# Patient Record
Sex: FEMALE | Race: WHITE | HISPANIC OR LATINO
[De-identification: names, ages, dates, MRNs, and addresses within clinical notes are randomized per-mention and may not be internally consistent; named-entity substitution may affect disease eponyms.]

---

## 2018-04-30 ENCOUNTER — APPOINTMENT (OUTPATIENT)
Dept: CARDIOLOGY | Facility: CLINIC | Age: 43
End: 2018-04-30
Payer: COMMERCIAL

## 2018-04-30 ENCOUNTER — NON-APPOINTMENT (OUTPATIENT)
Age: 43
End: 2018-04-30

## 2018-04-30 VITALS
BODY MASS INDEX: 38.62 KG/M2 | DIASTOLIC BLOOD PRESSURE: 83 MMHG | WEIGHT: 218 LBS | SYSTOLIC BLOOD PRESSURE: 151 MMHG | OXYGEN SATURATION: 98 % | HEART RATE: 83 BPM | RESPIRATION RATE: 17 BRPM | HEIGHT: 63 IN

## 2018-04-30 DIAGNOSIS — Z78.9 OTHER SPECIFIED HEALTH STATUS: ICD-10-CM

## 2018-04-30 DIAGNOSIS — R06.09 OTHER FORMS OF DYSPNEA: ICD-10-CM

## 2018-04-30 DIAGNOSIS — R07.89 OTHER CHEST PAIN: ICD-10-CM

## 2018-04-30 DIAGNOSIS — Z86.39 PERSONAL HISTORY OF OTHER ENDOCRINE, NUTRITIONAL AND METABOLIC DISEASE: ICD-10-CM

## 2018-04-30 DIAGNOSIS — Z82.3 FAMILY HISTORY OF STROKE: ICD-10-CM

## 2018-04-30 DIAGNOSIS — R00.2 PALPITATIONS: ICD-10-CM

## 2018-04-30 PROCEDURE — 93306 TTE W/DOPPLER COMPLETE: CPT

## 2018-04-30 PROCEDURE — 99244 OFF/OP CNSLTJ NEW/EST MOD 40: CPT

## 2018-04-30 PROCEDURE — 93000 ELECTROCARDIOGRAM COMPLETE: CPT

## 2018-04-30 RX ORDER — METOPROLOL SUCCINATE 25 MG/1
25 TABLET, EXTENDED RELEASE ORAL
Refills: 0 | Status: ACTIVE | COMMUNITY

## 2018-05-14 ENCOUNTER — APPOINTMENT (OUTPATIENT)
Dept: CARDIOLOGY | Facility: CLINIC | Age: 43
End: 2018-05-14
Payer: COMMERCIAL

## 2018-05-14 DIAGNOSIS — R94.39 ABNORMAL RESULT OF OTHER CARDIOVASCULAR FUNCTION STUDY: ICD-10-CM

## 2018-05-14 PROCEDURE — 93015 CV STRESS TEST SUPVJ I&R: CPT

## 2018-05-31 ENCOUNTER — APPOINTMENT (OUTPATIENT)
Dept: CARDIOLOGY | Facility: CLINIC | Age: 43
End: 2018-05-31
Payer: COMMERCIAL

## 2018-05-31 PROCEDURE — A9500: CPT

## 2018-05-31 PROCEDURE — 78452 HT MUSCLE IMAGE SPECT MULT: CPT

## 2018-05-31 PROCEDURE — 93015 CV STRESS TEST SUPVJ I&R: CPT

## 2018-12-22 ENCOUNTER — EMERGENCY (EMERGENCY)
Facility: HOSPITAL | Age: 43
LOS: 1 days | Discharge: HOME | End: 2018-12-22
Attending: EMERGENCY MEDICINE | Admitting: EMERGENCY MEDICINE
Payer: COMMERCIAL

## 2018-12-22 VITALS
WEIGHT: 229.94 LBS | SYSTOLIC BLOOD PRESSURE: 189 MMHG | HEART RATE: 74 BPM | HEIGHT: 62 IN | DIASTOLIC BLOOD PRESSURE: 85 MMHG | OXYGEN SATURATION: 98 % | RESPIRATION RATE: 16 BRPM | TEMPERATURE: 98 F

## 2018-12-22 VITALS
HEART RATE: 77 BPM | OXYGEN SATURATION: 99 % | RESPIRATION RATE: 18 BRPM | DIASTOLIC BLOOD PRESSURE: 79 MMHG | SYSTOLIC BLOOD PRESSURE: 154 MMHG

## 2018-12-22 DIAGNOSIS — S89.90XA UNSPECIFIED INJURY OF UNSPECIFIED LOWER LEG, INITIAL ENCOUNTER: ICD-10-CM

## 2018-12-22 PROCEDURE — 99283 EMERGENCY DEPT VISIT LOW MDM: CPT

## 2018-12-22 PROCEDURE — 73564 X-RAY EXAM KNEE 4 OR MORE: CPT | Mod: 26,LT

## 2018-12-22 PROCEDURE — 73564 X-RAY EXAM KNEE 4 OR MORE: CPT

## 2018-12-22 RX ORDER — IBUPROFEN 200 MG
600 TABLET ORAL ONCE
Qty: 0 | Refills: 0 | Status: COMPLETED | OUTPATIENT
Start: 2018-12-22 | End: 2018-12-22

## 2018-12-22 RX ADMIN — Medication 600 MILLIGRAM(S): at 15:51

## 2018-12-22 RX ADMIN — Medication 600 MILLIGRAM(S): at 16:46

## 2018-12-22 NOTE — ED PROVIDER NOTE - PROGRESS NOTE DETAILS
PA Tiberio- xray with mild joint effusion, no acute fractures or dislocations. Able to bear weight. No acute trauma or injury with no ligament instability- bulky Cordero applied with cane- Ortho referral given. Discussed weight loss in great detail.

## 2018-12-22 NOTE — ED ADULT NURSE NOTE - OBJECTIVE STATEMENT
Patient report having left knee pain that started 1 week ago and gradually getting worst. Patient denies any injury or long traveling.

## 2018-12-22 NOTE — ED PROVIDER NOTE - CARE PROVIDER_API CALL
Meng Fonseca), Orthopaedic Surgery; Sports Medicine  [address] [place] [address]  Phone: [phone]  Fax: [phone]

## 2018-12-22 NOTE — ED PROVIDER NOTE - ATTENDING CONTRIBUTION TO CARE
Dr. Mcknight: I performed a face to face bedside interview with patient regarding history of present illness, review of symptoms and past medical history. I completed an independent physical exam.  I have discussed patient's plan of care with PA.   I agree with note as stated above, having amended the EMR as needed to reflect my findings.   This includes HISTORY OF PRESENT ILLNESS, HIV, PAST MEDICAL/SURGICAL/FAMILY/SOCIAL HISTORY, ALLERGIES AND HOME MEDICATIONS, REVIEW OF SYSTEMS, PHYSICAL EXAM, and any PROGRESS NOTES during the time I functioned as the attending physician for this patient.    43F h/o DM, HTN, obesity, p/w atraumatic left knee pain x 1 week, able to ambulate but with pain. No fevers or chills.    Exam sig for point ttp over left inferior mid knee, no laxity, no rashes    Plan - motrin, xray

## 2018-12-22 NOTE — ED PROVIDER NOTE - OBJECTIVE STATEMENT
44 y/o obese F with h/o DM, HTN presenting with atraumatic left knee pain x 1 week. Denies injury, trauma, weakness, numbness, tingling, redness, warmth, fever, chills. States this morning the pain was so severe she had trouble walking.

## 2018-12-22 NOTE — ED PROCEDURE NOTE - ATTENDING CONTRIBUTION TO CARE
Dr. Mcknight: I performed a face to face bedside interview with patient regarding history of present illness, review of symptoms and past medical history. I completed an independent physical exam.  I have discussed patient's plan of care with PA.   I agree with note as stated above, having amended the EMR as needed to reflect my findings.   This includes HISTORY OF PRESENT ILLNESS, HIV, PAST MEDICAL/SURGICAL/FAMILY/SOCIAL HISTORY, ALLERGIES AND HOME MEDICATIONS, REVIEW OF SYSTEMS, PHYSICAL EXAM, and any PROGRESS NOTES during the time I functioned as the attending physician for this patient.

## 2018-12-22 NOTE — ED PROVIDER NOTE - MEDICAL DECISION MAKING DETAILS
Atraumatic left knee Injury- xray, Motrin, Knee immobilizer, Cane, ortho follow up for MRI vs. physical therapy

## 2018-12-22 NOTE — ED PROVIDER NOTE - NSFOLLOWUPCLINICS_GEN_ALL_ED_FT
[facility]  Orthopedic Surgery  [address] [address]  Phone: [phone]  Fax:   Follow Up Time:

## 2018-12-22 NOTE — ED PROVIDER NOTE - NSFOLLOWUPINSTRUCTIONS_ED_ALL_ED_FT
Follow up with PMD within 48-72 hrs. Rest and elevate your knee.  Apply ice 20 minutes on 2-3 times/day as needed for pain or swelling. Take Motrin 400mg every 6-8 hrs with food as needed for pain.  Keep ace wrap on during the day for comfort and support. Use cane provided for you as needed to assist with ambulation. Follow up with the Orthopedist doctor within the week for further evaluation- referrals listed above.

## 2019-03-15 PROBLEM — I10 ESSENTIAL (PRIMARY) HYPERTENSION: Chronic | Status: ACTIVE | Noted: 2018-12-22

## 2019-03-15 PROBLEM — E78.00 PURE HYPERCHOLESTEROLEMIA, UNSPECIFIED: Chronic | Status: ACTIVE | Noted: 2018-12-22

## 2019-04-05 ENCOUNTER — OUTPATIENT (OUTPATIENT)
Dept: OUTPATIENT SERVICES | Facility: HOSPITAL | Age: 44
LOS: 1 days | End: 2019-04-05
Payer: COMMERCIAL

## 2019-04-05 ENCOUNTER — APPOINTMENT (OUTPATIENT)
Dept: MAMMOGRAPHY | Facility: HOSPITAL | Age: 44
End: 2019-04-05

## 2019-04-05 DIAGNOSIS — Z00.8 ENCOUNTER FOR OTHER GENERAL EXAMINATION: ICD-10-CM

## 2019-04-05 PROCEDURE — 77067 SCR MAMMO BI INCL CAD: CPT | Mod: 26

## 2019-04-05 PROCEDURE — 77063 BREAST TOMOSYNTHESIS BI: CPT | Mod: 26

## 2019-04-05 PROCEDURE — 77067 SCR MAMMO BI INCL CAD: CPT

## 2019-04-05 PROCEDURE — 77063 BREAST TOMOSYNTHESIS BI: CPT

## 2019-04-26 ENCOUNTER — APPOINTMENT (OUTPATIENT)
Dept: ULTRASOUND IMAGING | Facility: HOSPITAL | Age: 44
End: 2019-04-26
Payer: COMMERCIAL

## 2019-04-26 ENCOUNTER — OUTPATIENT (OUTPATIENT)
Dept: OUTPATIENT SERVICES | Facility: HOSPITAL | Age: 44
LOS: 1 days | End: 2019-04-26
Payer: COMMERCIAL

## 2019-04-26 DIAGNOSIS — Z00.8 ENCOUNTER FOR OTHER GENERAL EXAMINATION: ICD-10-CM

## 2019-04-26 PROCEDURE — 76830 TRANSVAGINAL US NON-OB: CPT

## 2019-04-26 PROCEDURE — 76830 TRANSVAGINAL US NON-OB: CPT | Mod: 26

## 2020-09-04 NOTE — ED ADULT NURSE NOTE - PAIN RATING/NUMBER SCALE (0-10): ACTIVITY
Plan: May want LN2 face in the future.
Detail Level: Zone
Render In Strict Bullet Format?: No
Initiate Treatment: Momentasone cream\\nAllegra QAM \\nZytrec QHS prn itch
Otc Regimen: La roche intensive repair moisturizing cream\\nCerave facial cleanser \\nNeutrogena rapid wrinkle repair only when healed. Would like to try something for rejuvenation. Lancome product seemed to irritate skin.
10

## 2020-09-08 ENCOUNTER — EMERGENCY (EMERGENCY)
Facility: HOSPITAL | Age: 45
LOS: 1 days | Discharge: HOME | End: 2020-09-08
Attending: EMERGENCY MEDICINE | Admitting: EMERGENCY MEDICINE
Payer: COMMERCIAL

## 2020-09-08 VITALS
RESPIRATION RATE: 17 BRPM | HEART RATE: 85 BPM | TEMPERATURE: 98 F | WEIGHT: 225.09 LBS | OXYGEN SATURATION: 96 % | SYSTOLIC BLOOD PRESSURE: 159 MMHG | HEIGHT: 62 IN | DIASTOLIC BLOOD PRESSURE: 89 MMHG

## 2020-09-08 VITALS
RESPIRATION RATE: 14 BRPM | SYSTOLIC BLOOD PRESSURE: 152 MMHG | DIASTOLIC BLOOD PRESSURE: 81 MMHG | OXYGEN SATURATION: 98 %

## 2020-09-08 DIAGNOSIS — Z98.891 HISTORY OF UTERINE SCAR FROM PREVIOUS SURGERY: Chronic | ICD-10-CM

## 2020-09-08 DIAGNOSIS — R10.9 UNSPECIFIED ABDOMINAL PAIN: ICD-10-CM

## 2020-09-08 LAB
ALBUMIN SERPL ELPH-MCNC: 3.7 G/DL — SIGNIFICANT CHANGE UP (ref 3.3–5)
ALP SERPL-CCNC: 107 U/L — SIGNIFICANT CHANGE UP (ref 40–120)
ALT FLD-CCNC: 145 U/L — HIGH (ref 10–45)
ANION GAP SERPL CALC-SCNC: 11 MMOL/L — SIGNIFICANT CHANGE UP (ref 5–17)
APPEARANCE UR: CLEAR — SIGNIFICANT CHANGE UP
AST SERPL-CCNC: 101 U/L — HIGH (ref 10–40)
BACTERIA # UR AUTO: ABNORMAL /HPF
BASOPHILS # BLD AUTO: 0.02 K/UL — SIGNIFICANT CHANGE UP (ref 0–0.2)
BASOPHILS NFR BLD AUTO: 0.3 % — SIGNIFICANT CHANGE UP (ref 0–2)
BILIRUB SERPL-MCNC: 0.2 MG/DL — SIGNIFICANT CHANGE UP (ref 0.2–1.2)
BILIRUB UR-MCNC: NEGATIVE — SIGNIFICANT CHANGE UP
BUN SERPL-MCNC: 13 MG/DL — SIGNIFICANT CHANGE UP (ref 7–23)
CALCIUM SERPL-MCNC: 9.1 MG/DL — SIGNIFICANT CHANGE UP (ref 8.4–10.5)
CHLORIDE SERPL-SCNC: 104 MMOL/L — SIGNIFICANT CHANGE UP (ref 96–108)
CO2 SERPL-SCNC: 25 MMOL/L — SIGNIFICANT CHANGE UP (ref 22–31)
COLOR SPEC: YELLOW — SIGNIFICANT CHANGE UP
CREAT SERPL-MCNC: 0.81 MG/DL — SIGNIFICANT CHANGE UP (ref 0.5–1.3)
DIFF PNL FLD: ABNORMAL
EOSINOPHIL # BLD AUTO: 0.18 K/UL — SIGNIFICANT CHANGE UP (ref 0–0.5)
EOSINOPHIL NFR BLD AUTO: 2.6 % — SIGNIFICANT CHANGE UP (ref 0–6)
EPI CELLS # UR: SIGNIFICANT CHANGE UP
GLUCOSE SERPL-MCNC: 185 MG/DL — HIGH (ref 70–99)
GLUCOSE UR QL: NEGATIVE — SIGNIFICANT CHANGE UP
HCT VFR BLD CALC: 36.8 % — SIGNIFICANT CHANGE UP (ref 34.5–45)
HGB BLD-MCNC: 12.2 G/DL — SIGNIFICANT CHANGE UP (ref 11.5–15.5)
IMM GRANULOCYTES NFR BLD AUTO: 0.6 % — SIGNIFICANT CHANGE UP (ref 0–1.5)
KETONES UR-MCNC: NEGATIVE — SIGNIFICANT CHANGE UP
LEUKOCYTE ESTERASE UR-ACNC: NEGATIVE — SIGNIFICANT CHANGE UP
LIDOCAIN IGE QN: 230 U/L — SIGNIFICANT CHANGE UP (ref 73–393)
LYMPHOCYTES # BLD AUTO: 2.4 K/UL — SIGNIFICANT CHANGE UP (ref 1–3.3)
LYMPHOCYTES # BLD AUTO: 34.7 % — SIGNIFICANT CHANGE UP (ref 13–44)
MCHC RBC-ENTMCNC: 25.7 PG — LOW (ref 27–34)
MCHC RBC-ENTMCNC: 33.2 GM/DL — SIGNIFICANT CHANGE UP (ref 32–36)
MCV RBC AUTO: 77.5 FL — LOW (ref 80–100)
MONOCYTES # BLD AUTO: 0.4 K/UL — SIGNIFICANT CHANGE UP (ref 0–0.9)
MONOCYTES NFR BLD AUTO: 5.8 % — SIGNIFICANT CHANGE UP (ref 2–14)
NEUTROPHILS # BLD AUTO: 3.87 K/UL — SIGNIFICANT CHANGE UP (ref 1.8–7.4)
NEUTROPHILS NFR BLD AUTO: 56 % — SIGNIFICANT CHANGE UP (ref 43–77)
NITRITE UR-MCNC: NEGATIVE — SIGNIFICANT CHANGE UP
NRBC # BLD: 0 /100 WBCS — SIGNIFICANT CHANGE UP (ref 0–0)
PH UR: 5 — SIGNIFICANT CHANGE UP (ref 5–8)
PLATELET # BLD AUTO: 225 K/UL — SIGNIFICANT CHANGE UP (ref 150–400)
POTASSIUM SERPL-MCNC: 3.5 MMOL/L — SIGNIFICANT CHANGE UP (ref 3.5–5.3)
POTASSIUM SERPL-SCNC: 3.5 MMOL/L — SIGNIFICANT CHANGE UP (ref 3.5–5.3)
PROT SERPL-MCNC: 7.7 G/DL — SIGNIFICANT CHANGE UP (ref 6–8.3)
PROT UR-MCNC: 15
RBC # BLD: 4.75 M/UL — SIGNIFICANT CHANGE UP (ref 3.8–5.2)
RBC # FLD: 13.7 % — SIGNIFICANT CHANGE UP (ref 10.3–14.5)
RBC CASTS # UR COMP ASSIST: ABNORMAL /HPF (ref 0–4)
SODIUM SERPL-SCNC: 140 MMOL/L — SIGNIFICANT CHANGE UP (ref 135–145)
SP GR SPEC: 1.01 — SIGNIFICANT CHANGE UP (ref 1.01–1.02)
UROBILINOGEN FLD QL: NEGATIVE — SIGNIFICANT CHANGE UP
WBC # BLD: 6.91 K/UL — SIGNIFICANT CHANGE UP (ref 3.8–10.5)
WBC # FLD AUTO: 6.91 K/UL — SIGNIFICANT CHANGE UP (ref 3.8–10.5)
WBC UR QL: SIGNIFICANT CHANGE UP /HPF (ref 0–5)

## 2020-09-08 PROCEDURE — 36415 COLL VENOUS BLD VENIPUNCTURE: CPT

## 2020-09-08 PROCEDURE — 80053 COMPREHEN METABOLIC PANEL: CPT

## 2020-09-08 PROCEDURE — 99285 EMERGENCY DEPT VISIT HI MDM: CPT

## 2020-09-08 PROCEDURE — 81025 URINE PREGNANCY TEST: CPT

## 2020-09-08 PROCEDURE — 96374 THER/PROPH/DIAG INJ IV PUSH: CPT | Mod: XU

## 2020-09-08 PROCEDURE — 87086 URINE CULTURE/COLONY COUNT: CPT

## 2020-09-08 PROCEDURE — 81001 URINALYSIS AUTO W/SCOPE: CPT

## 2020-09-08 PROCEDURE — 96361 HYDRATE IV INFUSION ADD-ON: CPT

## 2020-09-08 PROCEDURE — 96375 TX/PRO/DX INJ NEW DRUG ADDON: CPT

## 2020-09-08 PROCEDURE — 85025 COMPLETE CBC W/AUTO DIFF WBC: CPT

## 2020-09-08 PROCEDURE — 74177 CT ABD & PELVIS W/CONTRAST: CPT | Mod: 26

## 2020-09-08 PROCEDURE — 74177 CT ABD & PELVIS W/CONTRAST: CPT

## 2020-09-08 PROCEDURE — 83690 ASSAY OF LIPASE: CPT

## 2020-09-08 PROCEDURE — 99284 EMERGENCY DEPT VISIT MOD MDM: CPT | Mod: 25

## 2020-09-08 RX ORDER — SODIUM CHLORIDE 9 MG/ML
1000 INJECTION INTRAMUSCULAR; INTRAVENOUS; SUBCUTANEOUS ONCE
Refills: 0 | Status: COMPLETED | OUTPATIENT
Start: 2020-09-08 | End: 2020-09-08

## 2020-09-08 RX ORDER — ONDANSETRON 8 MG/1
4 TABLET, FILM COATED ORAL ONCE
Refills: 0 | Status: COMPLETED | OUTPATIENT
Start: 2020-09-08 | End: 2020-09-08

## 2020-09-08 RX ORDER — MORPHINE SULFATE 50 MG/1
4 CAPSULE, EXTENDED RELEASE ORAL ONCE
Refills: 0 | Status: DISCONTINUED | OUTPATIENT
Start: 2020-09-08 | End: 2020-09-08

## 2020-09-08 RX ADMIN — ONDANSETRON 4 MILLIGRAM(S): 8 TABLET, FILM COATED ORAL at 15:05

## 2020-09-08 RX ADMIN — MORPHINE SULFATE 4 MILLIGRAM(S): 50 CAPSULE, EXTENDED RELEASE ORAL at 15:05

## 2020-09-08 RX ADMIN — SODIUM CHLORIDE 1000 MILLILITER(S): 9 INJECTION INTRAMUSCULAR; INTRAVENOUS; SUBCUTANEOUS at 16:05

## 2020-09-08 RX ADMIN — SODIUM CHLORIDE 1000 MILLILITER(S): 9 INJECTION INTRAMUSCULAR; INTRAVENOUS; SUBCUTANEOUS at 15:05

## 2020-09-08 RX ADMIN — MORPHINE SULFATE 4 MILLIGRAM(S): 50 CAPSULE, EXTENDED RELEASE ORAL at 15:20

## 2020-09-08 NOTE — ED PROVIDER NOTE - NSFOLLOWUPINSTRUCTIONS_ED_ALL_ED_FT
Follow up with your pmd within 48 hours- show copies of ER results.   Take tylenol 650 mg every 6 hours as needed for pain   Return to the ED if any worsening or persistent symptoms.     Dolor abdominal kd    LO QUE NECESITA SABER:    No se puede encontrar la causa del dolor abdominal. Si se encuentra rajni causa, el tratamiento dependerá de cuál es fuad causa.    INSTRUCCIONES SOBRE EL GINO HOSPITALARIA:    Regrese a la nile de emergencias si:    Usted no puede dejar de vomitar o vomita natalya.      Usted tiene natalya en las evacuaciones intestinales o estas tienen un aspecto alquitranado.      Usted tiene sangrado por hope recto.      El tamaño del abdomen es más marin de lo normal y se siente tarik y más doloroso.      Tiene dolor abdominal intenso.      Usted demarcus de tener flatulencias y evacuaciones intestinales.      Usted se siente mareado, débil o tiene sensación de desmayo.    Comuníquese con hope médico si:    Tiene fiebre.      Tiene nuevos signos y síntomas.      Kourtney síntomas no mejoran con el tratamiento.      Usted tiene preguntas o inquietudes acerca de hope condición o cuidado.    Los medicamentosestos pueden administrarse para disminuir el dolor, tratar rajni infección y manejar kourtney síntomas. B and E kourtney medicamentos peace se le haya indicado. Llame a hope médico si usted piensa que el medicamento no está ayudando o si tiene efectos secundarios. Infórmele si es alérgico a cualquier medicamento. Mantenga rajni lista actualizada de los medicamentos, las vitaminas y los productos herbales que young. Incluya los siguientes datos de los medicamentos: cantidad, frecuencia y motivo de administración. Traiga con usted la lista o los envases de las píldoras a kourtney citas de seguimiento. Lleve la lista de los medicamentos con usted en deana de rajni emergencia.    El manejo de kourtney síntomas:    La aplicación de calorsobre el abdomen de 20 a 30 minutos cada 2 horas por los días que le indiquen. El calor ayuda a disminuir el dolor y los espasmos musculares.      Controle hope estrés.El estrés puede causar dolor abdominal. Hope médico puede recomendarle técnicas de relajación y ejercicios de respiración profunda para ayudar a disminuir el estrés. Hope médico puede recomendarle que hable con alguien sobre hope estrés o ansiedad, peace un consejero o un amigo de confianza. Duerma lo suficiente y realice ejercicio regularmente.      Limite o no consuma bebidas alcohólicas.El alcohol puede empeorar el dolor abdominal. Pregunte a hope médico si usted puede josé miguel alcohol. Pregunte cuanto es la cantidad armando para usted josé miguel.      No fume.La nicotina y otros químicos en los cigarrillos pueden dañarle el esófago y el estómago. Pida información a hope médico si usted actualmente fuma y necesita ayuda para dejar de fumar. Los cigarrillos electrónicos o el tabaco sin humo igualmente contienen nicotina. Consulte con hope médico antes de utilizar estos productos.    Realice cambios en los alimentos que consume según se le indique:No coma alimentos que causan dolor abdominal u otros síntomas. Ingiera comidas pequeñas, más a menudo.     Coma más alimentos ricos en fibra si tiene estreñimiento. Los alimentos altos en fibra incluyen frutas, verduras, alimentos de grano integral y legumbres.      No coma alimentos que causan gas si tiene distensión. Por ejemplo, brócoli, col y coliflor. No henna gaseosas o bebidas carbonadas, ya que también pueden provocarle gases.      No consuma alimentos o bebidas que contienen sorbitol o fructosa si tiene diarrea y distensión. Algunos ejemplos son jugos de frutas, dulces, mermeladas y gomas de mascar sin azúcar.      No coma alimentos altos en grasas, peace comidas fritas, hamburguesas con queso, salchichas y postres.      Limite o no tome cafeína. La cafeína puede empeorar los síntomas, peace la acidez o las náuseas.      Henna suficientes líquidos para evitar la deshidratación causada por la diarrea o los vómitos. Pregunte a hope médico sobre la cantidad de líquido que necesita josé miguel todos los sony y cuáles le recomienda.    Acuda a kourtney consultas de control con hope médico según le indicaron.Anote kourtney preguntas para que se acuerde de hacerlas gregorio kourtney visitas.       © Copyright [employer] 2020       back to top                      © Copyright [employer] 2020

## 2020-09-08 NOTE — ED PROVIDER NOTE - PATIENT PORTAL LINK FT
You can access the FollowMyHealth Patient Portal offered by [facility] by registering at the following website: [email or web address]. By joining [facility]’s FollowMyHealth portal, you will also be able to view your health information using other applications (apps) compatible with our system.

## 2020-09-08 NOTE — ED PROVIDER NOTE - ATTENDING CONTRIBUTION TO CARE
Alicia with [facility]. 44 yr old female with hx of HTN, HLD, DM presents with b/l lower abdominal pain since this morning. + associated nausea. No vomiting.  No fever, chills, urinary complaints, sob, chest pain or any other symptoms.   well appearing, abdomen tenderness to lower right and left , RRR, lungs clear   ct showing no acute findings. Pain improved. Pt stable for dc and fu with pmd.    I performed a face to face bedside interview with patient regarding history of present illness, review of symptoms and past medical history. I completed an independent physical exam.  I have discussed the patient's plan of care with Physician Assistant (PA). I agree with note as stated above, having amended the EMR as needed to reflect my findings.   This includes History of Present Illness, HIV, Past Medical/Surgical/Family/Social History, Allergies and Home Medications, Review of Systems, Physical Exam, and any Progress Notes during the time I functioned as the attending physician for this patient.

## 2020-09-08 NOTE — ED ADULT NURSE NOTE - OBJECTIVE STATEMENT
Patient presents to ED complaining of lower abdominal pain since this AM. Patient states pain is associated with nausea. She denies vomiting, denies chest pain, denies diarrhea, denies SOB. Pain radiates to epigastrium from lower abdomen.

## 2020-09-08 NOTE — ED PROVIDER NOTE - OBJECTIVE STATEMENT
44 yr old female with hx of HTN, HLD, DM presents with b/l lower abdominal pain since this morning. + associated nausea. No vomiting.  No fever, chills, urinary complaints, sob, chest pain or any other symptoms.

## 2020-09-08 NOTE — ED ADULT NURSE NOTE - NSIMPLEMENTINTERV_GEN_ALL_ED
Implemented All Universal Safety Interventions:  [place] to call system. Call bell, personal items and telephone within reach. Instruct patient to call for assistance. Room bathroom lighting operational. Non-slip footwear when patient is off stretcher. Physically safe environment: no spills, clutter or unnecessary equipment. Stretcher in lowest position, wheels locked, appropriate side rails in place.

## 2020-09-08 NOTE — ED PROVIDER NOTE - CLINICAL SUMMARY MEDICAL DECISION MAKING FREE TEXT BOX
44 yr old female with hx of HTN, HLD, DM presents with b/l lower abdominal pain since this morning. + associated nausea. No vomiting.  No fever, chills, urinary complaints, sob, chest pain or any other symptoms.   well appearing, abdomen tenderness to lower right and left , RRR, lungs clear 44 yr old female with hx of HTN, HLD, DM presents with b/l lower abdominal pain since this morning. + associated nausea. No vomiting.  No fever, chills, urinary complaints, sob, chest pain or any other symptoms.   well appearing, abdomen tenderness to lower right and left , RRR, lungs clear   ct showing no acute findings. Pain improved. Pt stable for dc and fu with pmd.

## 2020-09-09 LAB
CULTURE RESULTS: SIGNIFICANT CHANGE UP
SPECIMEN SOURCE: SIGNIFICANT CHANGE UP

## 2021-03-22 ENCOUNTER — EMERGENCY (EMERGENCY)
Facility: HOSPITAL | Age: 46
LOS: 1 days | Discharge: HOME | End: 2021-03-22
Attending: EMERGENCY MEDICINE | Admitting: EMERGENCY MEDICINE
Payer: COMMERCIAL

## 2021-03-22 VITALS
RESPIRATION RATE: 17 BRPM | OXYGEN SATURATION: 99 % | SYSTOLIC BLOOD PRESSURE: 154 MMHG | HEART RATE: 80 BPM | DIASTOLIC BLOOD PRESSURE: 82 MMHG

## 2021-03-22 VITALS
TEMPERATURE: 98 F | DIASTOLIC BLOOD PRESSURE: 91 MMHG | OXYGEN SATURATION: 100 % | HEART RATE: 87 BPM | WEIGHT: 210.98 LBS | HEIGHT: 62 IN | SYSTOLIC BLOOD PRESSURE: 174 MMHG | RESPIRATION RATE: 18 BRPM

## 2021-03-22 DIAGNOSIS — Z98.891 HISTORY OF UTERINE SCAR FROM PREVIOUS SURGERY: Chronic | ICD-10-CM

## 2021-03-22 PROCEDURE — 99283 EMERGENCY DEPT VISIT LOW MDM: CPT

## 2021-03-22 PROCEDURE — 99284 EMERGENCY DEPT VISIT MOD MDM: CPT

## 2021-03-22 RX ORDER — DIPHENHYDRAMINE HCL 50 MG
50 CAPSULE ORAL ONCE
Refills: 0 | Status: COMPLETED | OUTPATIENT
Start: 2021-03-22 | End: 2021-03-22

## 2021-03-22 RX ADMIN — Medication 60 MILLIGRAM(S): at 21:49

## 2021-03-22 RX ADMIN — Medication 50 MILLIGRAM(S): at 20:37

## 2021-03-22 NOTE — ED PROVIDER NOTE - OBJECTIVE STATEMENT
45 yr old female with hx of HTN, HLD, DM presents with hives for the last 3 days, worsening today. + tingling of tongue. No n/v/d, chest pain, sob or swelling of tongue. Pt reports seen in by Dr. Maya today and given shot of steriod and then sent medrol dose pack to pharmacy. Pt reports feeling worse. last took benadryl at 10 am. No known new drugs, soaps, detergents or foods.

## 2021-03-22 NOTE — ED PROVIDER NOTE - NSFOLLOWUPCLINICS_GEN_ALL_ED_FT
[facility]  Allergy  [address] [place] [address]  Phone: [phone]  Fax:   Follow Up Time:

## 2021-03-22 NOTE — ED ADULT NURSE NOTE - OBJECTIVE STATEMENT
3 PM prednisone shot Patient alert and oriented X4, c/o allergic reaction. Patient reports that approx 3 days ago she began developing hives and "tingling of tongue" from an unknown source. She took OTC Benadryl and felt "some relief". Today itchiness and hives spread so patient scheduled an appt with her PCP and received a "Prednisone shot" per patient. Patient denies any relief from shot. Patient denies known allergies to medications/ food and reports that she did not start any new regiment/ diet. Patient denies SOB, numbness/tingling in tongue/throat at the moment, chest pain, palpitations, HA, dizziness, weakness, recent illness, pain, or any other complaints.

## 2021-03-22 NOTE — ED PROVIDER NOTE - CARE PLAN
Principal Discharge DX:	Allergic reaction   Principal Discharge DX:	Allergic reaction  Secondary Diagnosis:	Hives

## 2021-03-22 NOTE — ED PROVIDER NOTE - CARE PROVIDER_API CALL
Florian Maya (DO)  Internal Medicine  [address] [id number]  Phone: [phone]  Fax: [phone]  Follow Up Time:

## 2021-03-22 NOTE — ED PROVIDER NOTE - CLINICAL SUMMARY MEDICAL DECISION MAKING FREE TEXT BOX
45 yr old female with hx of HTN, HLD, DM presents with hives for the last 3 days, worsening today. + tingling of tongue. No n/v/d, chest pain, sob or swelling of tongue. Pt reports seen in by Dr. Maya today and given shot of steriod and then sent medrol dose pack to pharmacy. Pt reports feeling worse. last took benadryl at 10 am. No known new drugs, soaps, detergents or foods.  diffuse hives all over body   no respiratory distress, no tongue swelling   speaking full clear sentences 45 yr old female with hx of HTN, HLD, DM presents with hives for the last 3 days, worsening today. + tingling of tongue. No n/v/d, chest pain, sob or swelling of tongue. Pt reports seen in by Dr. Maya today and given shot of steriod and then sent medrol dose pack to pharmacy. Pt reports feeling worse. last took benadryl at 10 am. No known new drugs, soaps, detergents or foods.  diffuse hives all over body   no respiratory distress, no tongue swelling   speaking full clear sentences   after benadryl, itchiness have improved, given dose of prednsione 60 mg once in ED, and then will send pt home with prednsione 60 mg for 4 more days, when done pt to take medrol dose pack Blade: 45 yr old female with hx of HTN, HLD, DM presents with hives for the last 3 days, worsening today. + tingling of tongue. No n/v/d, chest pain, sob or swelling of tongue. Pt reports seen in by Dr. Maya today and given shot of steriod and then sent medrol dose pack to pharmacy. Pt reports feeling worse. last took benadryl at 10 am. No known new drugs, soaps, detergents or foods.  diffuse hives all over body   no respiratory distress, no tongue swelling   speaking full clear sentences   after benadryl, itchiness have improved, given dose of prednsione 60 mg once in ED, and then will send pt home with prednsione 60 mg for 4 more days, when done pt to take medrol dose pack

## 2021-03-22 NOTE — ED PROVIDER NOTE - PHYSICAL EXAMINATION
diffuse hives all over body   no respiratory distress, no tongue swelling   speaking full clear sentences

## 2021-03-22 NOTE — ED PROVIDER NOTE - PATIENT PORTAL LINK FT
You can access the FollowMyHealth Patient Portal offered by [facility] by registering at the following website: [email or web address]. By joining [facility]’s FollowMyHealth portal, you will also be able to view your health information using other applications (apps) compatible with our system.

## 2021-03-22 NOTE — ED PROVIDER NOTE - NSFOLLOWUPINSTRUCTIONS_ED_ALL_ED_FT
Take prednisone 60 mg starting tomorrow for 4 days. then when done with prednisone, take medrol dose pack until completely.   Take benadryl 25 mg every 4- 6 hours as needed for itchiness   Follow up with pmd or family practice.   Follow up with allergist   return to the ED if any worsening or persistent symptoms          Urticaria    LO QUE NECESITA SABER:    ¿Qué es la urticaria?La urticaria también se conoce peace ronchas. Las ronchas pueden cambiar el tamaño y la forma y aparecen en cualquier lugar de la piel. La urticaria puede ser leve o severa y durar de unos minutos a unos días. Es probable que las ronchas ajit un signo de rajni reacción alérgica grave conocida peace anafilaxis que necesita tratamiento inmediato. La urticaria que dura más de 6 semanas puede ser un trastorno crónico que necesita de tratamiento de larga duración.    ¿Qué causa de la urticaria?Las ronchas son causadas por rajni reacción del sistema inmune. Las siguientes son algunas de las causas mas comunes:   •Alergias a alimentos, peace a las nueces, huevos o mariscos      •Colorantes, aditivos o preservantes      •Alergias a medicamentos, peace al ibuprofeno o a los antibióticos      •Infecciones, peace un resfriado o rajni infección viral peace la mononucleosis      •Picaduras de insectos      •Mascotas, plantas o látex      •Estrés      ¿Cómo se diagnostica la causa de la urticaria?Hope médico lo examinará y le hará preguntas acerca de kourtney síntomas. También le puede preguntar sobre hope historial familiar, medicamentos que young y alimentos que consume. Informe a hope médico sobre cualquier trauma reciente, estres, o contacto con alérgenos. Es probable que usted también necesite realizarse exámenes adicionales si desarrolló anafilaxia después de madelin estado expuesto a un factor desencadenante y luego hizo ejercicio. [place] se conoce epace anafilaxia inducida por el ejercicio. Es posible que usted necesite alguno de los siguientes:   •Un examen de pielse usa para hafsa la reacción de hope piel a los posibles desencadenantes. Hope médico le colocará en la piel rajni cantidad pequeña de rajni de las sustancias o posibles causantes de la urticaria. Luego le cubrirá el área con un parche que permanecerá en hope lugar por 2 días. Después le revisará la piel para hafsa si hay reacción.      •Un examen de reacciónse usa para darle dosis en aumento de lo que podría estar causándole la urticaria. Hope médico observará si presenta rajni reacción.      ¿Cómo se trata la urticaria?La urticaria generalmente desaparece sin tratamiento. Es probable que la urticaria crónica tenga que ser tratada con más de un medicamento u otros medicamentos que los mencionados a continuación. Los siguientes son los medicamentos usados más comúnmente para tratar síntomas moderados no fritz resultado:   •Los antihistamínicosreducen los síntomas moderados peace la comezón o un sarpullido.      •Esteroidesreducen el enrojecimiento, el dolor y la inflamación.      •La epinefrinase usa para tratar reacciones alérgicas graves peace la anafilaxia.      ¿Qué pasos necesito seguir cuando hay señales o síntomas de anafilaxis?  •Inmediatamenteaplíquese 1 inyección de epinefrina thor hágalo solamente en el músculo del muslo que da hacia afuera.      •Deje la inyección en el lugarsegún las indicaciones. Es probable que hope médico le recomiende que se la deje puesta por hasta 10 segundos antes de quitarla. [place] ayuda a asegurarse de que toda la epinefrina sea aplicada.      •Llame al 911 y vaya al servicio de urgencias,aunque la inyección haya radha kourtney síntomas. No maneje usted mismo. Lleve con usted la inyección de epinefrina que usó.      ¿Que precauciones de seguridad necesito seguir si estoy en riesgo de presentar anafilaxia?  •Tenga a mano 2 inyecciones de epinefrina en todo momento.Puede que usted necesite rajni segunda inyección ya que la epinefrina solamente actúa por aproximadamente 20 minutos y los síntomas podrían regresar. Hope médico puede mostrarle a usted y a kourtney familiares cómo aplicar la inyección. Revise la fecha de vencimiento todos los meses y reemplace el medicamento de hope vencimiento.      •Elabore un plan de acción.Hope médico puede ayudarle a crear un plan escrito que explique la alergia y un plan de emergencia para tratar rajni reacción. El plan explica cuándo aplicar rajni segunda inyección de epinefrina si los síntomas vuelven o no mejoran después de la primera inyección. Asegúrese de que kourtney familiares, personal de hope trabajo y escuela tengan rajni copia del plan de acción e instrucciones de emergencia. Muéstreles cómo aplicar la inyección de epinefrina.      •Tenga cuidado cuando hailey ejercicio.Si usted tuvo anafilaxis inducida por el ejercicio, no se ejercite inmediatamente después de comer. Pare de ejercitarse de inmediato si empieza a desarrollar cualquier signo o síntoma de anafilaxia. Puede que al principio se sienta cansado, caliente o tenga comezón en la piel. También podría desarrollar urticaria, inflamación y problemas graves de respiración si continúa ejercitándose.      •Lleve rajni identificación de alerta médica.Use un brazalete o collar de alerta médica o lleve rajni tarjeta que explique la alergia. Pregúntele a hope médico dónde conseguir estos artículos.   Accesorios de alerta médica           •Mantenga un diario de los desencadenantes y síntomas.Anote todo lo que usted come, young o aplique en hope piel por 3 semanas. Incluya eventos estresantes y lo que usted estaba haciendo shelia antes de que empezara la urticaria. Traiga hope registro a las citas de seguimiento con hope médico.      ¿Qué puedo hacer para manejar la urticaria?  •Enfríe hope piel.Puede que esto le ayude a disminuir la comezón. Aplíquese rajni compresa fría sobre la urticaria. Sumerja rajni toalla en agua fría, escúrrala y póngasela sobre la urticaria. También puede empapar hope piel en un baño de agua fría con myles.      •No se rasque las ronchas.[place] puede irritar hope piel y causarle más ronchas.      •Use ropa holgada.La ropa ajustada podría irritar hope piel y causarle más ronchas.      •Controle el estrés.El estrés podría provocar la urticaria o incluso empeorarla. Aprenda nuevas maneras de relajarse peace la respiración profunda.      Llame al 911 en deana de presentar signos o síntomas de anafilaxia,peace dificultad para respirar, inflamación en hope boca o garganta, o sibilancias. También es posible que tenga comezón, sarpullido o sienta que se va a desmayar.    ¿Cuándo kevin buscar atención inmediata?  •Hope corazón está latiendo más rápido de lo normal.      •Usted tiene calambres o dolor cameron en el abdomen.      ¿Cuándo kevin comunicarme con mi médico?  •Tiene fiebre.      •Hope piel todavía tiene comezón 24 horas después de tomarse hope medicamento.      •Usted todavía tiene ronchas 7 días después.      •Kourtney articulaciones están adoloridas e inflamadas.      •Usted tiene preguntas o inquietudes acerca de hope condición o cuidado.      ACUERDOS SOBRE HOPE CUIDADO:    Usted tiene el derecho de ayudar a planear hope cuidado. Aprenda todo lo que pueda sobre hope condición y peace darle tratamiento. Discuta kourtney opciones de tratamiento con kuortney médicos para decidir el cuidado que usted desea recibir. Usted siempre tiene el derecho de rechazar el tratamiento.       © Copyright [employer] 2021           back to top                          © Copyright [employer] 2021

## 2021-03-24 ENCOUNTER — OUTPATIENT (OUTPATIENT)
Dept: OUTPATIENT SERVICES | Facility: HOSPITAL | Age: 46
LOS: 1 days | End: 2021-03-24
Payer: COMMERCIAL

## 2021-03-24 ENCOUNTER — APPOINTMENT (OUTPATIENT)
Dept: RADIOLOGY | Facility: HOSPITAL | Age: 46
End: 2021-03-24
Payer: COMMERCIAL

## 2021-03-24 DIAGNOSIS — Z00.8 ENCOUNTER FOR OTHER GENERAL EXAMINATION: ICD-10-CM

## 2021-03-24 DIAGNOSIS — Z98.891 HISTORY OF UTERINE SCAR FROM PREVIOUS SURGERY: Chronic | ICD-10-CM

## 2021-03-24 PROBLEM — E11.9 TYPE 2 DIABETES MELLITUS WITHOUT COMPLICATIONS: Chronic | Status: ACTIVE | Noted: 2021-03-22

## 2021-03-24 PROCEDURE — 73030 X-RAY EXAM OF SHOULDER: CPT | Mod: 26,50

## 2021-03-24 PROCEDURE — 73110 X-RAY EXAM OF WRIST: CPT | Mod: 26,50

## 2021-03-24 PROCEDURE — 73130 X-RAY EXAM OF HAND: CPT | Mod: 26,50

## 2021-03-24 PROCEDURE — 73130 X-RAY EXAM OF HAND: CPT

## 2021-03-24 PROCEDURE — 73110 X-RAY EXAM OF WRIST: CPT

## 2021-03-24 PROCEDURE — 73030 X-RAY EXAM OF SHOULDER: CPT

## 2021-05-11 ENCOUNTER — APPOINTMENT (OUTPATIENT)
Dept: DISASTER EMERGENCY | Facility: OTHER | Age: 46
End: 2021-05-11

## 2021-11-03 ENCOUNTER — APPOINTMENT (OUTPATIENT)
Dept: RADIOLOGY | Facility: HOSPITAL | Age: 46
End: 2021-11-03
Payer: COMMERCIAL

## 2021-11-03 ENCOUNTER — APPOINTMENT (OUTPATIENT)
Dept: MAMMOGRAPHY | Facility: HOSPITAL | Age: 46
End: 2021-11-03
Payer: COMMERCIAL

## 2021-11-03 ENCOUNTER — OUTPATIENT (OUTPATIENT)
Dept: OUTPATIENT SERVICES | Facility: HOSPITAL | Age: 46
LOS: 1 days | End: 2021-11-03
Payer: COMMERCIAL

## 2021-11-03 ENCOUNTER — APPOINTMENT (OUTPATIENT)
Dept: ULTRASOUND IMAGING | Facility: HOSPITAL | Age: 46
End: 2021-11-03
Payer: COMMERCIAL

## 2021-11-03 DIAGNOSIS — Z00.8 ENCOUNTER FOR OTHER GENERAL EXAMINATION: ICD-10-CM

## 2021-11-03 DIAGNOSIS — Z98.891 HISTORY OF UTERINE SCAR FROM PREVIOUS SURGERY: Chronic | ICD-10-CM

## 2021-11-03 PROCEDURE — 71046 X-RAY EXAM CHEST 2 VIEWS: CPT | Mod: 26

## 2021-11-03 PROCEDURE — 71046 X-RAY EXAM CHEST 2 VIEWS: CPT

## 2021-11-03 PROCEDURE — 76641 ULTRASOUND BREAST COMPLETE: CPT | Mod: 26,50

## 2021-11-03 PROCEDURE — 77063 BREAST TOMOSYNTHESIS BI: CPT | Mod: 26

## 2021-11-03 PROCEDURE — 77067 SCR MAMMO BI INCL CAD: CPT | Mod: 26

## 2021-11-03 PROCEDURE — 77067 SCR MAMMO BI INCL CAD: CPT

## 2021-11-03 PROCEDURE — 77063 BREAST TOMOSYNTHESIS BI: CPT

## 2021-11-03 PROCEDURE — 76641 ULTRASOUND BREAST COMPLETE: CPT

## 2021-12-01 ENCOUNTER — APPOINTMENT (OUTPATIENT)
Dept: ULTRASOUND IMAGING | Facility: HOSPITAL | Age: 46
End: 2021-12-01
Payer: COMMERCIAL

## 2021-12-01 ENCOUNTER — OUTPATIENT (OUTPATIENT)
Dept: OUTPATIENT SERVICES | Facility: HOSPITAL | Age: 46
LOS: 1 days | End: 2021-12-01
Payer: COMMERCIAL

## 2021-12-01 DIAGNOSIS — Z00.8 ENCOUNTER FOR OTHER GENERAL EXAMINATION: ICD-10-CM

## 2021-12-01 DIAGNOSIS — Z98.891 HISTORY OF UTERINE SCAR FROM PREVIOUS SURGERY: Chronic | ICD-10-CM

## 2021-12-01 PROCEDURE — 76642 ULTRASOUND BREAST LIMITED: CPT

## 2021-12-01 PROCEDURE — 76642 ULTRASOUND BREAST LIMITED: CPT | Mod: 26,50

## 2022-05-03 ENCOUNTER — RESULT REVIEW (OUTPATIENT)
Age: 47
End: 2022-05-03

## 2022-05-03 ENCOUNTER — APPOINTMENT (OUTPATIENT)
Dept: OBGYN | Facility: CLINIC | Age: 47
End: 2022-05-03
Payer: COMMERCIAL

## 2022-05-03 VITALS
SYSTOLIC BLOOD PRESSURE: 118 MMHG | HEIGHT: 63 IN | DIASTOLIC BLOOD PRESSURE: 72 MMHG | OXYGEN SATURATION: 98 % | HEART RATE: 74 BPM | WEIGHT: 210 LBS | BODY MASS INDEX: 37.21 KG/M2 | TEMPERATURE: 97.4 F

## 2022-05-03 DIAGNOSIS — Z01.419 ENCOUNTER FOR GYNECOLOGICAL EXAMINATION (GENERAL) (ROUTINE) W/OUT ABNORMAL FINDINGS: ICD-10-CM

## 2022-05-03 LAB
HCG UR QL: NEGATIVE
QUALITY CONTROL: YES

## 2022-05-03 PROCEDURE — 99386 PREV VISIT NEW AGE 40-64: CPT | Mod: 25

## 2022-05-03 PROCEDURE — 58100 BIOPSY OF UTERUS LINING: CPT

## 2022-05-10 LAB
C TRACH RRNA SPEC QL NAA+PROBE: NOT DETECTED
CANDIDA VAG CYTO: NOT DETECTED
CYTOLOGY CVX/VAG DOC THIN PREP: NORMAL
G VAGINALIS+PREV SP MTYP VAG QL MICRO: NOT DETECTED
HPV HIGH+LOW RISK DNA PNL CVX: NOT DETECTED
N GONORRHOEA RRNA SPEC QL NAA+PROBE: NOT DETECTED
SOURCE AMPLIFICATION: NORMAL
T VAGINALIS VAG QL WET PREP: NOT DETECTED

## 2022-05-10 NOTE — PHYSICAL EXAM
[Chaperone Present] : A chaperone was present in the examining room during all aspects of the physical examination [Appropriately responsive] : appropriately responsive [Alert] : alert [No Acute Distress] : no acute distress [Soft] : soft [Non-tender] : non-tender [Non-distended] : non-distended [No HSM] : No HSM [No Lesions] : no lesions [No Mass] : no mass [Oriented x3] : oriented x3 [Examination Of The Breasts] : a normal appearance [No Masses] : no breast masses were palpable [Labia Majora] : normal [Labia Minora] : normal [Polyp ___ cm] : [unfilled] ~[facility] polyp [Normal] : normal [Uterine Adnexae] : normal

## 2022-05-10 NOTE — PROCEDURE
[Endometrial Biopsy] : Endometrial biopsy [Time out performed] : Pre-procedure time out performed.  Patient's name, date of birth and procedure confirmed. [Consent Obtained] : Consent obtained [Pre-op Evaluation] : Pre-op evaluation [Irregular Bleeding] : irregular uterine bleeding [Risks] : risks [Benefits] : benefits [Alternatives] : alternatives [Patient] : patient [Infection] : infection [Bleeding] : bleeding [Allergic Reaction] : allergic reaction [Uterine Perforation] : uterine perforation [Pain] : pain [Negative] : negative pregnancy test [Betadine] : Betadine [___ mL Injected] : [unfilled] ~UmL of lidocaine [Tenaculum] : Tenaculum [Easy Passage] : Easy passage [Moderate] : moderate [Tolerated Well] : Patient tolerated the procedure well [No Complications] : No complications

## 2022-05-10 NOTE — PLAN
[FreeTextEntry1] : \par \par - Post procedure counseling given--patient to practice pelvic rest for 1 week, this includes nothing in the vagina, no submerging the vagina in water, no douching, no tampons\par \par I spent the time noted on the day of this patient encounter preparing for, providing and documenting the above E/M service and counseling and educate patient on differential, workup, disease course, and treatment/management. Education was provided to the patient during this encounter. All questions and concerns were answered and addressed in detail.\par \par Ayala Rosa MD\par

## 2022-05-10 NOTE — HISTORY OF PRESENT ILLNESS
[FreeTextEntry1] : 45 yo P5 with LMP ___ presents today for well woman exam. Heay/unpredictable menstrual bleeding\par \par \par POB: CSX3, one set of twins\par PGYN: reg, heavy, denies pelvic infections or abl pap--last seven years ago\par PMH: T2DM, HTN\par PSH: CSX3\par Med:per MAR\par All: [facility]\par SH: denies\par \par \par cervical polyp\par EMB\par

## 2022-06-02 ENCOUNTER — OUTPATIENT (OUTPATIENT)
Dept: OUTPATIENT SERVICES | Facility: HOSPITAL | Age: 47
LOS: 1 days | End: 2022-06-02
Payer: MEDICAID

## 2022-06-02 ENCOUNTER — RESULT REVIEW (OUTPATIENT)
Age: 47
End: 2022-06-02

## 2022-06-02 ENCOUNTER — APPOINTMENT (OUTPATIENT)
Dept: ULTRASOUND IMAGING | Facility: HOSPITAL | Age: 47
End: 2022-06-02
Payer: COMMERCIAL

## 2022-06-02 DIAGNOSIS — Z98.891 HISTORY OF UTERINE SCAR FROM PREVIOUS SURGERY: Chronic | ICD-10-CM

## 2022-06-02 DIAGNOSIS — Z01.419 ENCOUNTER FOR GYNECOLOGICAL EXAMINATION (GENERAL) (ROUTINE) WITHOUT ABNORMAL FINDINGS: ICD-10-CM

## 2022-06-02 PROCEDURE — 76642 ULTRASOUND BREAST LIMITED: CPT | Mod: 26,50

## 2022-06-02 PROCEDURE — 76642 ULTRASOUND BREAST LIMITED: CPT

## 2022-06-13 DIAGNOSIS — R92.8 OTHER ABNORMAL AND INCONCLUSIVE FINDINGS ON DIAGNOSTIC IMAGING OF BREAST: ICD-10-CM

## 2022-06-26 LAB — CORE LAB BIOPSY: NORMAL

## 2023-02-26 ENCOUNTER — EMERGENCY (EMERGENCY)
Facility: HOSPITAL | Age: 48
LOS: 1 days | Discharge: HOME | End: 2023-02-26
Attending: EMERGENCY MEDICINE | Admitting: EMERGENCY MEDICINE
Payer: MEDICAID

## 2023-02-26 VITALS
DIASTOLIC BLOOD PRESSURE: 80 MMHG | OXYGEN SATURATION: 98 % | HEART RATE: 72 BPM | RESPIRATION RATE: 16 BRPM | SYSTOLIC BLOOD PRESSURE: 144 MMHG

## 2023-02-26 VITALS
DIASTOLIC BLOOD PRESSURE: 92 MMHG | OXYGEN SATURATION: 97 % | SYSTOLIC BLOOD PRESSURE: 179 MMHG | WEIGHT: 208.34 LBS | HEART RATE: 79 BPM | RESPIRATION RATE: 18 BRPM | TEMPERATURE: 98 F

## 2023-02-26 DIAGNOSIS — Z98.891 HISTORY OF UTERINE SCAR FROM PREVIOUS SURGERY: Chronic | ICD-10-CM

## 2023-02-26 LAB
FLUAV AG NPH QL: SIGNIFICANT CHANGE UP
FLUBV AG NPH QL: SIGNIFICANT CHANGE UP
RSV RNA NPH QL NAA+NON-PROBE: SIGNIFICANT CHANGE UP
SARS-COV-2 RNA SPEC QL NAA+PROBE: SIGNIFICANT CHANGE UP

## 2023-02-26 PROCEDURE — 99284 EMERGENCY DEPT VISIT MOD MDM: CPT

## 2023-02-26 PROCEDURE — 99283 EMERGENCY DEPT VISIT LOW MDM: CPT

## 2023-02-26 PROCEDURE — 87637 SARSCOV2&INF A&B&RSV AMP PRB: CPT

## 2023-02-26 RX ORDER — IBUPROFEN 200 MG
600 TABLET ORAL ONCE
Refills: 0 | Status: COMPLETED | OUTPATIENT
Start: 2023-02-26 | End: 2023-02-26

## 2023-02-26 RX ORDER — METFORMIN HYDROCHLORIDE 850 MG/1
1 TABLET ORAL
Qty: 0 | Refills: 0 | DISCHARGE

## 2023-02-26 RX ORDER — AMOXICILLIN 250 MG/5ML
500 SUSPENSION, RECONSTITUTED, ORAL (ML) ORAL ONCE
Refills: 0 | Status: COMPLETED | OUTPATIENT
Start: 2023-02-26 | End: 2023-02-26

## 2023-02-26 RX ORDER — ATORVASTATIN CALCIUM 80 MG/1
1 TABLET, FILM COATED ORAL
Qty: 0 | Refills: 0 | DISCHARGE

## 2023-02-26 RX ORDER — OMEPRAZOLE 10 MG/1
1 CAPSULE, DELAYED RELEASE ORAL
Qty: 0 | Refills: 0 | DISCHARGE

## 2023-02-26 RX ORDER — EMPAGLIFLOZIN 10 MG/1
1 TABLET, FILM COATED ORAL
Qty: 0 | Refills: 0 | DISCHARGE

## 2023-02-26 RX ORDER — AMOXICILLIN 250 MG/5ML
1 SUSPENSION, RECONSTITUTED, ORAL (ML) ORAL
Qty: 20 | Refills: 0
Start: 2023-02-26 | End: 2023-03-07

## 2023-02-26 RX ORDER — AMLODIPINE BESYLATE 2.5 MG/1
1 TABLET ORAL
Qty: 0 | Refills: 0 | DISCHARGE

## 2023-02-26 RX ORDER — ASPIRIN/CALCIUM CARB/MAGNESIUM 324 MG
1 TABLET ORAL
Qty: 0 | Refills: 0 | DISCHARGE

## 2023-02-26 RX ADMIN — Medication 500 MILLIGRAM(S): at 11:01

## 2023-02-26 RX ADMIN — Medication 600 MILLIGRAM(S): at 11:01

## 2023-02-26 NOTE — ED PROVIDER NOTE - THROAT FINDINGS
Speaking clearly in complete sentences./OROPHARYNGEAL EXUDATE/THROAT RED/uvula midline/NO VESICLES/ULCERS/NO DROOLING/NO TONGUE ELEVATION/NO STRIDOR

## 2023-02-26 NOTE — ED PROVIDER NOTE - PATIENT PORTAL LINK FT
36.8 You can access the FollowMyHealth Patient Portal offered by [facility] by registering at the following website: [email or web address]. By joining [facility]’s FollowMyHealth portal, you will also be able to view your health information using other applications (apps) compatible with our system.

## 2023-02-26 NOTE — ED PROVIDER NOTE - CLINICAL SUMMARY MEDICAL DECISION MAKING FREE TEXT BOX
47-year-old female with a medical history including DM, HTN complaining of sore throat, generalized malaise, nonproductive cough, chills and headaches intermittently for approximately 3 weeks denies any chest pain, shortness of breath, abdominal pain, nauseous, vomiting, diarrhea. + Vaccination for Flu & COViD, Non-smoker.    On physical examination patient with erythematous edematous pharynx with tonsillar exudates on the left side.  History of apparent dryness flu/COVID PCR RVP, 47-year-old female with a medical history including DM, HTN complaining of sore throat, generalized malaise, nonproductive cough, chills and headaches intermittently for approximately 3 weeks, denies any chest pain, shortness of breath, abdominal pain, nauseous, vomiting, diarrhea. + Vaccination for Flu & COVID, Non-smoker.    On physical examination patient with erythematous edematous pharynx with tonsillar exudates on the left side.      FLU/COVID PCR, Abx for pharyngitis, PMD follow-up.

## 2023-02-26 NOTE — ED PROVIDER NOTE - NSFOLLOWUPINSTRUCTIONS_ED_ALL_ED_FT
Follow-up with your doctor.  Take antibiotics as prescribed.  May take Tylenol or Motrin as directed for pain or fever.  Drink a lot of water and fluids.  Return to ED for any concerns.  A test for viruses has been done.  If it is positive you will be contacted.      Pharyngitis    Upper body outline including the pharynx.   Pharyngitis is inflammation of the throat (pharynx). It is a very common cause of sore throat. Pharyngitis can be caused by a bacteria, but it is usually caused by a virus. Most cases of pharyngitis get better on their own without treatment.      What are the causes?    This condition may be caused by:  •Infection by viruses (viral). Viral pharyngitis spreads easily from person to person (is contagious) through coughing, sneezing, and sharing of personal items or utensils such as cups, forks, spoons, and toothbrushes.      •Infection by bacteria (bacterial). Bacterial pharyngitis may be spread by touching the nose or face after coming in contact with the bacteria, or through close contact, such as kissing.      •Allergies. Allergies can cause buildup of mucus in the throat (post-nasal drip), leading to inflammation and irritation. Allergies can also cause blocked nasal passages, forcing breathing through the mouth, which dries and irritates the throat.        What increases the risk?    You are more likely to develop this condition if:  •You are 5–24 years old.      •You are exposed to crowded environments such as , school, or dormitory living.      •You live in a cold climate.      •You have a weakened disease-fighting (immune) system.        What are the signs or symptoms?    Symptoms of this condition vary by the cause. Common symptoms of this condition include:  •Sore throat.      •Fatigue.      •Low-grade fever.      •Stuffy nose (nasal congestion) and cough.      •Headache.      Other symptoms may include:  •Glands in the neck (lymph nodes) that are swollen.      •Skin rashes.      •Plaque-like film on the throat or tonsils. This is often a symptom of bacterial pharyngitis.      •Vomiting.      •Red, itchy eyes (conjunctivitis).      •Loss of appetite.      •Joint pain and muscle aches.      •Enlarged tonsils.        How is this diagnosed?    This condition may be diagnosed based on your medical history and a physical exam. Your health care provider will ask you questions about your illness and your symptoms.    A swab of your throat may be done to check for bacteria (rapid strep test). Other lab tests may also be done, depending on the suspected cause, but these are rare.      How is this treated?    Many times, treatment is not needed for this condition. Pharyngitis usually gets better in 3–4 days without treatment.    Bacterial pharyngitis may be treated with antibiotic medicines.      Follow these instructions at home:    Medicines     •Take over-the-counter and prescription medicines only as told by your health care provider.      •If you were prescribed an antibiotic medicine, take it as told by your health care provider. Do not stop taking the antibiotic even if you start to feel better.      •Use throat sprays to soothe your throat as told by your health care provider.      •Children can get pharyngitis. Do not give your child aspirin because of the association with Reye's syndrome.        Managing pain   A cup of hot tea.   To help with pain, try:  •Sipping warm liquids, such as broth, herbal tea, or warm water.      •Eating or drinking cold or frozen liquids, such as frozen ice pops.      •Gargling with a mixture of salt and water 3–4 times a day or as needed. To make salt water, completely dissolve ½–1 tsp (3–6 g) of salt in 1 cup (237 mL) of warm water.      •Sucking on hard candy or throat lozenges.      •Putting a cool-mist humidifier in your bedroom at night to moisten the air.      •Sitting in the bathroom with the door closed for 5–10 minutes while you run hot water in the shower.        General instructions   A do not smoke cigarettes sign.   • Do not use any products that contain nicotine or tobacco. These products include cigarettes, chewing tobacco, and vaping devices, such as e-cigarettes. If you need help quitting, ask your health care provider.      •Rest as told by your health care provider.      •Drink enough fluid to keep your urine pale yellow.        How is this prevented?    To help prevent becoming infected or spreading infection:  •Wash your hands often with soap and water for at least 20 seconds. If soap and water are not available, use hand .    •Do not touch your eyes, nose, or mouth with unwashed hands, and wash hands after touching these areas.    •Do not share cups or eating utensils.    •Avoid close contact with people who are sick.      Contact a health care provider if:    •You have large, tender lumps in your neck.    •You have a rash.    •You cough up green, yellow-brown, or bloody mucus.        Get help right away if:    •Your neck becomes stiff.    •You drool or are unable to swallow liquids.    •You cannot drink or take medicines without vomiting.    •You have severe pain that does not go away, even after you take medicine.    •You have trouble breathing, and it is not caused by a stuffy nose.    •You have new pain and swelling in your joints such as the knees, ankles, wrists, or elbows.      These symptoms may represent a serious problem that is an emergency. Do not wait to see if the symptoms will go away. Get medical help right away. Call your local emergency services (911 in the [place] Do not drive yourself to the hospital.       Summary    •Pharyngitis is redness, pain, and swelling (inflammation) of the throat (pharynx).    •While pharyngitis can be caused by a bacteria, the most common causes are viral.    •Most cases of pharyngitis get better on their own without treatment.    •Bacterial pharyngitis is treated with antibiotic medicines.

## 2023-02-26 NOTE — ED PROVIDER NOTE - PHYSICAL EXAMINATION
erythematous edematous pharynx with tonsillar exudates on the left side. Neck supple, no dysphagia or dysphonia, uvula midline.

## 2023-02-26 NOTE — ED PROVIDER NOTE - ATTENDING APP SHARED VISIT CONTRIBUTION OF CARE
Alicia with [facility]. 47-year-old female with a medical history including DM, HTN complaining of sore throat, generalized malaise, nonproductive cough, chills and headaches intermittently for approximately 3 weeks denies any chest pain, shortness of breath, abdominal pain, nauseous, vomiting, diarrhea. + Vaccination for Flu & COViD, Non-smoker.    On physical examination patient with erythematous edematous pharynx with tonsillar exudates on the left side.  History of apparent dryness flu/COVID PCR RVP,    Plan for abx and PO NSAIDs or fever/pain control. +Exudative pharyngitis. F/U PCP    I performed a face to face bedside interview with patient regarding history of present illness, review of symptoms and past medical history. I completed an independent physical exam.  I have discussed the patient's plan of care with Physician Assistant (PA). I agree with note as stated above, having amended the EMR as needed to reflect my findings.   This includes History of Present Illness, HIV, Past Medical/Surgical/Family/Social History, Allergies and Home Medications, Review of Systems, Physical Exam, and any Progress Notes during the time I functioned as the attending physician for this patient.

## 2023-02-26 NOTE — ED ADULT NURSE NOTE - NSICDXPASTMEDICALHX_GEN_ALL_CORE_FT
PAST MEDICAL HISTORY:  DM (diabetes mellitus)     High cholesterol     Hypertension, unspecified type

## 2023-02-26 NOTE — ED PROVIDER NOTE - OBJECTIVE STATEMENT
47-year-old female with a medical history including DM, HTN complaining of sore throat, generalized malaise, nonproductive cough, chills and headaches intermittently for approximately 3 weeks, denies any chest pain, shortness of breath, abdominal pain, nauseous, vomiting, diarrhea. + Vaccination for Flu & COVID, Non-smoker.

## 2023-04-07 ENCOUNTER — OUTPATIENT (OUTPATIENT)
Dept: OUTPATIENT SERVICES | Facility: HOSPITAL | Age: 48
LOS: 1 days | End: 2023-04-07
Payer: MEDICAID

## 2023-04-07 ENCOUNTER — APPOINTMENT (OUTPATIENT)
Dept: RADIOLOGY | Facility: HOSPITAL | Age: 48
End: 2023-04-07
Payer: MEDICAID

## 2023-04-07 DIAGNOSIS — Z98.891 HISTORY OF UTERINE SCAR FROM PREVIOUS SURGERY: Chronic | ICD-10-CM

## 2023-04-07 DIAGNOSIS — Z00.8 ENCOUNTER FOR OTHER GENERAL EXAMINATION: ICD-10-CM

## 2023-04-07 PROCEDURE — 71046 X-RAY EXAM CHEST 2 VIEWS: CPT

## 2023-04-07 PROCEDURE — 71046 X-RAY EXAM CHEST 2 VIEWS: CPT | Mod: 26

## 2023-08-18 NOTE — ED ADULT TRIAGE NOTE - ACCOMPANIED BY
Patient is an 78 yo M w/ cardiac amyloid, HFpEF, chronic pleural effusions (w/ R PleurX), HTN, Afib (Eliquis), bradycardia s/p ppm, Parkinson's who was admitted on 8/9 after p/w SOB. Admitted for shock and ALAYNA requiring HD.    #ALAYNA - Likely 2/2 hypotension and recent outpatient Bactrim use as well as chronic Entresto use. Last HD session 8/11. Currently making good UOP with Lasix, creatinine plateaued and continues to improve, 2.6 today.   #Metabolic acidosis  #Hyperkalemia  - Monitor BMP and creatinine  - Renal recs appreciated, Lasix 40mg PO TIW  - Avoid nephrotoxic agents for now, will hold home Farxiga and Entresto, will need to f/u with cardiologist    #Shock - Septic vs cardiogenic/ADHF, resolved  - Normal SBP > 95, which is usual BP as per wife and aide at bedside  - c/w fludrocortisone for now, likely some autonomic dysfunction  - Completed empiric course of Zosyn    #Pleural effusion  - c/w PleurX drainage TIW    #Delirium - Improved  - c/w Zyprexa at home dose  - Will encourage frequent reorientation during daytime    Rufus Childs MD  Pulmonary & Critical Care
Immediate family member
Patient is an 80 yo M w/ cardiac amyloid, HFpEF, chronic pleural effusions (w/ R PleurX), HTN, Afib (Eliquis), bradycardia s/p ppm, Parkinson's who was admitted on 8/9 after p/w SOB. Admitted for shock and ALAYNA requiring HD.    #ALAYNA - Likely 2/2 hypotension and recent outpatient Bactrim use as well as chronic Entresto use. Last HD session 8/11. Currently making good UOP with Lasix, creatinine plateaued and improving gradually.   #Metabolic acidosis  #Hyperkalemia  - Will hold diuretics for now  - Strict I/Os  - Monitor BMP and creatinine  - f/u renal recs  - Avoid nephrotoxic agents    #Shock - Septic vs cardiogenic/ADHF, resolved  - Normal SBP > 95, which is usual BP as per wife and aide at bedside  - c/w fludrocortisone for now, likely some autonomic dysfunction  - Completed empiric course of Zosyn    #Pleural effusion  - c/w PleurX drainage TIW    #Delirium - Improved  - c/w Zyprexa at home dose  - Will encourage frequent reorientation during daytime    Rufus Childs MD  Pulmonary & Critical Care

## 2023-11-06 NOTE — ED ADULT TRIAGE NOTE - WEIGHT IN LBS
11/06/23 1042   Encounter Summary   Encounter Overview/Reason  Spiritual/Emotional Needs   Service Provided For: Patient   Referral/Consult From: [address] Children;Family members   Last Encounter  11/06/23  (visit w/pt, support, blessing ke 11/6/23)   Complexity of Encounter Moderate   Begin Time 1015   End Time  0930   Total Time Calculated 1395 min   Spiritual/Emotional needs   Type Spiritual Support   Rituals, Rites and Sacraments   Type Blessings   Assessment/Intervention/Outcome   Assessment Calm; Hopeful;Peaceful   Intervention Active listening;Explored/Affirmed feelings, thoughts, concerns;Sustaining Presence/Ministry of presence   Outcome Encouraged;Expressed Gratitude     Rev. Chente Cobian 210.9

## 2023-11-10 ENCOUNTER — APPOINTMENT (OUTPATIENT)
Dept: ENDOCRINOLOGY | Facility: CLINIC | Age: 48
End: 2023-11-10
Payer: MEDICAID

## 2023-11-10 VITALS
DIASTOLIC BLOOD PRESSURE: 80 MMHG | SYSTOLIC BLOOD PRESSURE: 140 MMHG | TEMPERATURE: 97.7 F | RESPIRATION RATE: 18 BRPM | WEIGHT: 210 LBS | BODY MASS INDEX: 37.21 KG/M2 | OXYGEN SATURATION: 98 % | HEART RATE: 74 BPM | HEIGHT: 63 IN

## 2023-11-10 PROCEDURE — 99204 OFFICE O/P NEW MOD 45 MIN: CPT

## 2023-11-10 RX ORDER — METFORMIN HYDROCHLORIDE 1000 MG/1
1000 TABLET, COATED ORAL
Qty: 180 | Refills: 3 | Status: ACTIVE | COMMUNITY
Start: 1900-01-01 | End: 1900-01-01

## 2023-11-10 RX ORDER — LANCETS 28 GAUGE
EACH MISCELLANEOUS
Qty: 100 | Refills: 3 | Status: ACTIVE | COMMUNITY
Start: 2023-11-10 | End: 1900-01-01

## 2023-11-10 RX ORDER — ICOSAPENT ETHYL 1 G/1
1 CAPSULE ORAL
Qty: 360 | Refills: 3 | Status: ACTIVE | COMMUNITY
Start: 2023-11-10 | End: 1900-01-01

## 2023-11-10 RX ORDER — FLUCONAZOLE 150 MG/1
150 TABLET ORAL
Qty: 2 | Refills: 0 | Status: DISCONTINUED | COMMUNITY
Start: 2022-05-03 | End: 2023-11-10

## 2023-11-10 RX ORDER — BLOOD-GLUCOSE METER
W/DEVICE KIT MISCELLANEOUS
Qty: 1 | Refills: 0 | Status: ACTIVE | COMMUNITY
Start: 2023-11-10 | End: 1900-01-01

## 2023-11-10 RX ORDER — HYDROCHLOROTHIAZIDE 12.5 MG/1
12.5 CAPSULE ORAL
Refills: 0 | Status: DISCONTINUED | COMMUNITY
End: 2023-11-10

## 2023-11-10 RX ORDER — EMPAGLIFLOZIN 25 MG/1
25 TABLET, FILM COATED ORAL
Qty: 90 | Refills: 3 | Status: ACTIVE | COMMUNITY
Start: 2023-11-10 | End: 1900-01-01

## 2023-11-10 RX ORDER — ISOPROPYL ALCOHOL 70 ML/100ML
SWAB TOPICAL
Qty: 100 | Refills: 3 | Status: ACTIVE | COMMUNITY
Start: 2023-11-10 | End: 1900-01-01

## 2023-11-10 RX ORDER — SIMVASTATIN 20 MG/1
20 TABLET, FILM COATED ORAL
Refills: 0 | Status: DISCONTINUED | COMMUNITY
End: 2023-11-10

## 2023-11-10 RX ORDER — OMEGA-3/DHA/EPA/FISH OIL 1200 MG
1200 CAPSULE ORAL
Refills: 0 | Status: DISCONTINUED | COMMUNITY
End: 2023-11-10

## 2023-11-10 RX ORDER — BLOOD SUGAR DIAGNOSTIC
STRIP MISCELLANEOUS
Qty: 100 | Refills: 3 | Status: ACTIVE | COMMUNITY
Start: 2023-11-10 | End: 1900-01-01

## 2023-11-10 RX ORDER — AMLODIPINE BESYLATE 5 MG/1
5 TABLET ORAL
Qty: 90 | Refills: 3 | Status: ACTIVE | COMMUNITY
Start: 2023-11-10 | End: 1900-01-01

## 2023-12-15 ENCOUNTER — APPOINTMENT (OUTPATIENT)
Dept: ENDOCRINOLOGY | Facility: CLINIC | Age: 48
End: 2023-12-15
Payer: MEDICAID

## 2023-12-15 VITALS
DIASTOLIC BLOOD PRESSURE: 78 MMHG | HEART RATE: 74 BPM | HEIGHT: 63 IN | BODY MASS INDEX: 37.74 KG/M2 | SYSTOLIC BLOOD PRESSURE: 122 MMHG | TEMPERATURE: 97.3 F | RESPIRATION RATE: 18 BRPM | OXYGEN SATURATION: 98 % | WEIGHT: 213 LBS

## 2023-12-15 PROCEDURE — 99214 OFFICE O/P EST MOD 30 MIN: CPT

## 2023-12-15 RX ORDER — HYDROCHLOROTHIAZIDE 12.5 MG/1
12.5 TABLET ORAL DAILY
Refills: 0 | Status: ACTIVE | COMMUNITY
Start: 2023-12-15

## 2023-12-15 RX ORDER — ATORVASTATIN CALCIUM 40 MG/1
40 TABLET, FILM COATED ORAL
Qty: 90 | Refills: 3 | Status: ACTIVE | COMMUNITY
Start: 2023-11-10

## 2023-12-15 NOTE — PHYSICAL EXAM
[de-identified] : General: No distress, well nourished Eyes: Normal Sclera, EOMI, PERRL ENT: Normal appearance of the nose, normal oropharynx Neck/Thyroid: No cervical lymphadenopathy, thyroid gland 20 g in size, no thyroid nodules, non-tender Respiratory: No use of accessory muscles of respiration, vesicular breath sounds heard bilaterally, no crepitations or ronchi Cardiovascular: S1 and S2 heard and normal, no S3 or S4, no murmurs, radial pulse normal bilaterally Abdomen: soft, non-tender, no masses, normal bowel sounds Musculoskeletal: No swelling or deformities of joints of hands, no pedal edema Neurological: Normal range of motion in the hands, Normal brachioradialis reflexes bilaterally Psychiatry: Patient converses normally, good judgement and insight Skin: No rashes in hands, no nodules palpated in hands  [de-identified] : I defer DM foot exam to podiatry

## 2023-12-15 NOTE — HISTORY OF PRESENT ILLNESS
[FreeTextEntry1] : Problems: 1. DM type 2 2. Hypertension 3. Hyperlipidemia/Hypertriglyceridemia - patient never had pancreatitis) 4. DM peripheral neuropathy  DM type 2 1. Diagnosed in 2015 2. Meds: Metformin 1000 mg po bid (no side effects) Jardiance 25 mg po daily (no UTIs or genital candidiasis) Ozempic 0.25 mg sc once weekly - No pancreatitis, no personal or family history of medullary thyroid cancer 3. Fingersticks done once daily - 117 to 200s, no hypoglycemic unawareness 4. Not on Aspirin, on atorvastatin 40 mg po daily, on icosapent ethyl 2 g po bid (dispensed as 1 g capsules), not ACEi/ARB 5. Complications: No DM nephropathy (normal creatinine) No DM retinopathy (last eye exam was in 2022, patient advised on the need for annual DM eye exam) Has DM peripheral neuropathy - on gabapentin 300 mg po daily No ASCVD No foot ulcers/amputation 6. Patient never smoked cigarettes

## 2023-12-15 NOTE — ASSESSMENT
[FreeTextEntry1] : Target: HbA1c < 7%, BP < 130/80, Triglycerides < 500  Fingersticks are above goal so I increased the dose of Ozempic BP is at goal Triglycerides are at goal.  Patient is on statin - no indication for Aspirin or ACEi/ARB.  Last lipid panel - Sept 2023 - Trig 128, LDL 79 Last HbA1c - 09/16/2023 - 8.3% Last Vitamin B12 - Sept 2023 - N Last urine albumin panel - None seen Last BMP/CMP - Sept 2023 - Cr, K, AST, ALT N  Plan: 1. Increase Ozempic to 0.5 mg sc once weekly 2. Fingersticks to be done once daily 3. Labs to be done next week (patient is doing labs for her PCP then at [facility]) - urine microalbumin panel 4. Follow up in 4 weeks to review meter and results

## 2024-01-12 ENCOUNTER — APPOINTMENT (OUTPATIENT)
Dept: ENDOCRINOLOGY | Facility: CLINIC | Age: 49
End: 2024-01-12

## 2024-02-20 ENCOUNTER — LABORATORY RESULT (OUTPATIENT)
Age: 49
End: 2024-02-20

## 2024-02-20 ENCOUNTER — APPOINTMENT (OUTPATIENT)
Dept: ENDOCRINOLOGY | Facility: CLINIC | Age: 49
End: 2024-02-20
Payer: COMMERCIAL

## 2024-02-20 VITALS
OXYGEN SATURATION: 98 % | SYSTOLIC BLOOD PRESSURE: 132 MMHG | WEIGHT: 198 LBS | HEART RATE: 80 BPM | RESPIRATION RATE: 18 BRPM | HEIGHT: 63 IN | DIASTOLIC BLOOD PRESSURE: 76 MMHG | BODY MASS INDEX: 35.08 KG/M2 | TEMPERATURE: 97.4 F

## 2024-02-20 PROCEDURE — G2211 COMPLEX E/M VISIT ADD ON: CPT

## 2024-02-20 PROCEDURE — 99214 OFFICE O/P EST MOD 30 MIN: CPT

## 2024-02-20 NOTE — ASSESSMENT
[FreeTextEntry1] : Target: HbA1c < 7%, BP < 130/80, Triglycerides < 500  HbA1c is above goal so I increased the dose of Ozempic - this is also being used to promote weight loss. Labs from Feb 2024 revealed iron deficiency (serum ferritin low with low MCV) so the HbA1c may be falsely elevated in this patient.  BP is mildly above goal but I will monitor this for now.  Triglycerides are at goal.  Patient is on statin - no indication for Aspirin or ACEi/ARB.  Last lipid panel - Sept 2023 - Trig 128, LDL 79 Last HbA1c - 02/16/2024 - 7% Last Vitamin B12 - Sept 2023 - N Last urine albumin panel - None seen Last BMP/CMP - Feb 2024 - Cr, K, AST, ALT N  Plan: 1. Increase Ozempic to 0.5 mg sc once weekly 2. Fingersticks to be done once daily 3. Labs to be done today - urine microalbumin panel 4. Follow up in 4 weeks to review meter and results.

## 2024-02-20 NOTE — HISTORY OF PRESENT ILLNESS
[FreeTextEntry1] : Problems: 1. DM type 2 2. Hypertension 3. Hyperlipidemia/Hypertriglyceridemia - patient never had pancreatitis - TSH normal in Feb 2024) 4. DM peripheral neuropathy  DM type 2 1. Diagnosed in 2015 2. Meds: Metformin 1000 mg po bid (no side effects) Jardiance 25 mg po daily (no UTIs or genital candidiasis) Ozempic 0.25 mg sc once weekly (no side effects) - No pancreatitis, no personal or family history of medullary thyroid cancer 3. Fingersticks done once daily - 117 to 160s, no hypoglycemic unawareness 4. Not on Aspirin, on atorvastatin 40 mg po daily, on icosapent ethyl 2 g po bid (dispensed as 1 g capsules), not ACEi/ARB 5. Complications: No DM nephropathy (normal creatinine) No DM retinopathy (last eye exam was in August 2023, patient advised on the need for annual DM eye exam) Has DM peripheral neuropathy - on gabapentin 300 mg po daily - patient not fully compliant with this as she has intermittent lightheadedness with this - she does not want to discontinue gabapentin.  No ASCVD No foot ulcers/amputation 6. Patient never smoked cigarettes

## 2024-02-20 NOTE — PHYSICAL EXAM
[de-identified] : General: No distress, well nourished Eyes: Normal Sclera, EOMI, PERRL ENT: Normal appearance of the nose, normal oropharynx Neck/Thyroid: No cervical lymphadenopathy, thyroid gland 20 g in size, no thyroid nodules, non-tender Respiratory: No use of accessory muscles of respiration, vesicular breath sounds heard bilaterally, no crepitations or ronchi Cardiovascular: S1 and S2 heard and normal, no S3 or S4, no murmurs, radial pulse normal bilaterally Abdomen: soft, non-tender, no masses, normal bowel sounds Musculoskeletal: No swelling or deformities of joints of hands, no pedal edema Neurological: Normal range of motion in the hands, Normal brachioradialis reflexes bilaterally Psychiatry: Patient converses normally, good judgement and insight Skin: No rashes in hands, no nodules palpated in hands  [de-identified] : I defer DM foot exam to podiatry

## 2024-03-22 ENCOUNTER — APPOINTMENT (OUTPATIENT)
Dept: ENDOCRINOLOGY | Facility: CLINIC | Age: 49
End: 2024-03-22
Payer: COMMERCIAL

## 2024-03-22 VITALS
HEART RATE: 77 BPM | WEIGHT: 192 LBS | TEMPERATURE: 97.3 F | DIASTOLIC BLOOD PRESSURE: 76 MMHG | HEIGHT: 63 IN | BODY MASS INDEX: 34.02 KG/M2 | OXYGEN SATURATION: 97 % | RESPIRATION RATE: 18 BRPM | SYSTOLIC BLOOD PRESSURE: 135 MMHG

## 2024-03-22 PROCEDURE — 99214 OFFICE O/P EST MOD 30 MIN: CPT

## 2024-03-22 PROCEDURE — G2211 COMPLEX E/M VISIT ADD ON: CPT

## 2024-03-22 RX ORDER — LOSARTAN POTASSIUM 25 MG/1
25 TABLET, FILM COATED ORAL DAILY
Qty: 90 | Refills: 3 | Status: ACTIVE | COMMUNITY
Start: 2024-03-22 | End: 1900-01-01

## 2024-03-22 NOTE — HISTORY OF PRESENT ILLNESS
[FreeTextEntry1] : Problems: 1. DM type 2 2. Hypertension 3. Hyperlipidemia/Hypertriglyceridemia - patient never had pancreatitis - TSH normal in Feb 2024) 4. DM peripheral neuropathy  DM type 2 1. Diagnosed in 2015 2. Meds: Metformin 1000 mg po bid (no side effects) Jardiance 25 mg po daily (no UTIs or genital candidiasis) Ozempic 0.5 mg sc once weekly (no side effects) - No pancreatitis, no personal or family history of medullary thyroid cancer 3. Fingersticks done three times per week - 114 to 150s, no hypoglycemic unawareness 4. Not on Aspirin, on atorvastatin 40 mg po daily, on icosapent ethyl 2 g po bid (dispensed as 1 g capsules), not ACEi/ARB - I PRESCRIBED losartan 25 mg po daily ON 03/22/2024.  5. Complications: No DM nephropathy (normal creatinine, positive urine microalbumin panel in Feb 2024) No DM retinopathy (last eye exam was in August 2023, patient advised on the need for annual DM eye exam) Has DM peripheral neuropathy - on gabapentin 300 mg po daily - patient not fully compliant with this as she has intermittent lightheadedness with this - she does not want to discontinue gabapentin.  No ASCVD No foot ulcers/amputation 6. Patient never smoked cigarettes

## 2024-03-22 NOTE — PHYSICAL EXAM
[de-identified] : General: No distress, well nourished Eyes: Normal Sclera, EOMI, PERRL ENT: Normal appearance of the nose, normal oropharynx Neck/Thyroid: No cervical lymphadenopathy, thyroid gland 20 g in size, no thyroid nodules, non-tender Respiratory: No use of accessory muscles of respiration, vesicular breath sounds heard bilaterally, no crepitations or ronchi Cardiovascular: S1 and S2 heard and normal, no S3 or S4, no murmurs, radial pulse normal bilaterally Abdomen: soft, non-tender, no masses, normal bowel sounds Musculoskeletal: No swelling or deformities of joints of hands, no pedal edema Neurological: Normal range of motion in the hands, Normal brachioradialis reflexes bilaterally Psychiatry: Patient converses normally, good judgement and insight Skin: No rashes in hands, no nodules palpated in hands  [de-identified] : I defer DM foot exam to podiatry

## 2024-03-22 NOTE — ASSESSMENT
[FreeTextEntry1] : Target: HbA1c < 7%, BP < 130/80, Triglycerides < 500  HbA1c is above goal  but fingersticks are mainly at goal however patient wishes to lose some more weight l so I increased the dose of Ozempic - this is also being used to promote weight loss. Labs from Feb 2024 revealed iron deficiency (serum ferritin low with low MCV) so the HbA1c may be falsely elevated in this patient. BP is mildly above goal and she had a positive urine microalbumin panel in Feb 2024 so I prescribed losartan  Triglycerides are at goal.  Patient is on statin - no indication for Aspirin - I PRESCRIBED losartan on 03/22/2024.   Last lipid panel - Sept 2023 - Trig 128, LDL 79 Last HbA1c - 02/16/2024 - 7% Last Vitamin B12 - Sept 2023 - N Last urine albumin panel - Feb 2024 - positive Last BMP/CMP - Feb 2024 - Cr, K, AST, ALT N  Plan: 1. Increase Ozempic to 1 mg sc once weekly 2. Start losartan 25 mg po daily 3. Fingersticks to be done once daily 4. Labs to be done in 2 months  - CBC, CMP, HbA1c, urine microalbumin panel 5. Follow up in 4 weeks to review meter and results.

## 2024-04-12 RX ORDER — SEMAGLUTIDE 1.34 MG/ML
4 INJECTION, SOLUTION SUBCUTANEOUS
Qty: 3 | Refills: 3 | Status: ACTIVE | COMMUNITY
Start: 2023-11-10 | End: 1900-01-01

## 2024-04-26 ENCOUNTER — NON-APPOINTMENT (OUTPATIENT)
Age: 49
End: 2024-04-26

## 2024-04-26 ENCOUNTER — APPOINTMENT (OUTPATIENT)
Dept: CARDIOLOGY | Facility: CLINIC | Age: 49
End: 2024-04-26
Payer: COMMERCIAL

## 2024-04-26 VITALS
SYSTOLIC BLOOD PRESSURE: 146 MMHG | HEIGHT: 63 IN | WEIGHT: 193 LBS | OXYGEN SATURATION: 99 % | DIASTOLIC BLOOD PRESSURE: 86 MMHG | HEART RATE: 70 BPM | BODY MASS INDEX: 34.2 KG/M2

## 2024-04-26 PROCEDURE — 93015 CV STRESS TEST SUPVJ I&R: CPT

## 2024-04-26 PROCEDURE — 93000 ELECTROCARDIOGRAM COMPLETE: CPT | Mod: 59

## 2024-04-26 PROCEDURE — 99204 OFFICE O/P NEW MOD 45 MIN: CPT | Mod: 25

## 2024-05-02 NOTE — CARDIOLOGY SUMMARY
[de-identified] : 4/26/24 normal [de-identified] : 4/26/24   6' right arm pains non diagnostic due to failure to achieve 85% target heart rate

## 2024-05-02 NOTE — PHYSICAL EXAM

## 2024-05-02 NOTE — ASSESSMENT
[FreeTextEntry1] : I have asked CEE to undergo detailed cardiac testing in order to evaluate  her  overall cardiovascular risk. An assessment of both structural and functional heart disease was recommended to the patient. In this regard, a stress test and cardiac CTA were advised to the patient. I await the upcoming noninvasive cardiac testing in order to assess her overall cardiovascular risk. We discussed the pros and cons of plain treadmill stress testing nuclear stress testing and angiography including a sensitivity analysis.We discussed current ACC guidelines and the calculated 10 year risk is approximately  to be determined. More than half of the face to face encounter of    60 minutes was spent in counseling the patient with respect to  symptoms and cardiovascular risk  Quality measures  Tobacco intervention not indicated Statin for prevention of cardiovascular disease to be determined Hypertension compensated Aspirin for ischemic vascular disease not indicated Tobacco screening cessation and intervention not indicated  Medical necessity This is a high encounter based upon two or more chronic illnesses with mild exacerbation requiring further management and evaluation.  .  EKG is indicated for evaluation of dyspnea.  risks benefits alternatives were discussed with the patient. all questions were answered to Her satisfaction.

## 2024-05-02 NOTE — REASON FOR VISIT
[Symptom and Test Evaluation] : symptom and test evaluation [Coronary Artery Disease] : coronary artery disease [FreeTextEntry3] : Dr Maya

## 2024-05-02 NOTE — HISTORY OF PRESENT ILLNESS
[FreeTextEntry1] :  CEE KAPADIA comes today for evaluation. She has type II diabetes hypertension peripheral neuropathy and hyperlipidemia. She was recently placed on Ozempic and losartan 25mg. She was advised to undergo a complete cardiac evaluation. She denies chest pains or loss of consciousness but has noted shortness of breath. Her father had stroke and myocardial infarction she has dyspnea on exertion. She works at the [employer]. Her mother is alive and well she is from [facility]. Recent lipids and A1c are  LDL79 A1c 7%.

## 2024-05-14 ENCOUNTER — APPOINTMENT (OUTPATIENT)
Dept: CT IMAGING | Facility: CLINIC | Age: 49
End: 2024-05-14

## 2024-05-22 ENCOUNTER — APPOINTMENT (OUTPATIENT)
Dept: ENDOCRINOLOGY | Facility: CLINIC | Age: 49
End: 2024-05-22
Payer: COMMERCIAL

## 2024-05-22 VITALS
DIASTOLIC BLOOD PRESSURE: 70 MMHG | TEMPERATURE: 98 F | OXYGEN SATURATION: 96 % | HEART RATE: 90 BPM | SYSTOLIC BLOOD PRESSURE: 144 MMHG | WEIGHT: 198 LBS | RESPIRATION RATE: 18 BRPM | BODY MASS INDEX: 35.08 KG/M2 | HEIGHT: 63 IN

## 2024-05-22 DIAGNOSIS — I10 ESSENTIAL (PRIMARY) HYPERTENSION: ICD-10-CM

## 2024-05-22 DIAGNOSIS — E78.1 PURE HYPERGLYCERIDEMIA: ICD-10-CM

## 2024-05-22 DIAGNOSIS — E11.42 TYPE 2 DIABETES MELLITUS WITH DIABETIC POLYNEUROPATHY: ICD-10-CM

## 2024-05-22 DIAGNOSIS — E78.5 HYPERLIPIDEMIA, UNSPECIFIED: ICD-10-CM

## 2024-05-22 DIAGNOSIS — E11.9 TYPE 2 DIABETES MELLITUS W/OUT COMPLICATIONS: ICD-10-CM

## 2024-05-22 PROCEDURE — 99214 OFFICE O/P EST MOD 30 MIN: CPT

## 2024-05-22 PROCEDURE — G2211 COMPLEX E/M VISIT ADD ON: CPT

## 2024-05-22 NOTE — HISTORY OF PRESENT ILLNESS
[FreeTextEntry1] : Problems: 1. DM type 2 2. Hypertension 3. Hyperlipidemia/Hypertriglyceridemia - patient never had pancreatitis - TSH normal in Feb 2024) 4. DM peripheral neuropathy  DM type 2 1. Diagnosed in 2015 2. Meds: Metformin 1000 mg po bid (no side effects) Jardiance 25 mg po daily (no UTIs or genital candidiasis) Ozempic 1 mg sc once weekly (no side effects) - No pancreatitis, no personal or family history of medullary thyroid cancer 3. Fingersticks done three times per week - 117 to 130s, no hypoglycemic unawareness 4. Not on Aspirin, on atorvastatin 40 mg po daily, on icosapent ethyl 2 g po bid (dispensed as 1 g capsules), on losartan 25 mg po daily  5. Complications: No DM nephropathy (normal creatinine, positive urine microalbumin panel in Feb 2024) No DM retinopathy (last eye exam was in August 2023, patient advised on the need for annual DM eye exam) Has DM peripheral neuropathy - on gabapentin 300 mg po daily - patient not fully compliant with this as she has intermittent lightheadedness with this - she does not want to discontinue gabapentin.  No ASCVD No foot ulcers/amputation 6. Patient never smoked cigarettes

## 2024-05-22 NOTE — ASSESSMENT
[FreeTextEntry1] : Target: HbA1c < 7%, BP < 130/80, Triglycerides < 500  HbA1c is above goal but fingersticks are mainly at goal. The dose of Ozempic was increased to 1 mg in March 2024 but patient has the 0.5 mg pens as the 1 mg pens needs prior authorization - patient used two of the 0.5 mg dose today to get 1 mg but I advised her not to do this as she will run out. I will try to obtain the prior auth for Ozempic.  Labs from Feb 2024 revealed iron deficiency (serum ferritin low with low MCV) so the HbA1c may be falsely elevated in this patient. BP is above goal but I will monitor this for now as the patient has an URI Triglycerides are at goal.  Patient is on statin and ARB - no indication for Aspirin   Last lipid panel - Sept 2023 - Trig 128, LDL 79 Last HbA1c - 02/16/2024 - 7% Last Vitamin B12 - Sept 2023 - N Last urine albumin panel - Feb 2024 - positive Last BMP/CMP - Feb 2024 - Cr, K, AST, ALT N  Plan: 1. Patient to use Ozempic 0.5 mg sc once weekly until the 1 mg dose is approved and then she is to use Ozempic 1 mg sc once weekly 2. Fingersticks to be done once daily 3. Labs to be done in 2 months - CBC, CMP, HbA1c, urine microalbumin panel 4. Follow up in 3 months to review meter and results.

## 2024-05-22 NOTE — PHYSICAL EXAM
[de-identified] : General: No distress, well nourished Eyes: Normal Sclera, EOMI, PERRL ENT: Normal appearance of the nose, normal oropharynx Neck/Thyroid: No cervical lymphadenopathy, thyroid gland 20 g in size, no thyroid nodules, non-tender Respiratory: No use of accessory muscles of respiration, vesicular breath sounds heard bilaterally, no crepitations or ronchi Cardiovascular: S1 and S2 heard and normal, no S3 or S4, no murmurs, radial pulse normal bilaterally Abdomen: soft, non-tender, no masses, normal bowel sounds Musculoskeletal: No swelling or deformities of joints of hands, no pedal edema Neurological: Normal range of motion in the hands, Normal brachioradialis reflexes bilaterally Psychiatry: Patient converses normally, good judgement and insight Skin: No rashes in hands, no nodules palpated in hands  [de-identified] : I defer DM foot exam to podiatry

## 2024-05-31 ENCOUNTER — APPOINTMENT (OUTPATIENT)
Dept: CARDIOLOGY | Facility: CLINIC | Age: 49
End: 2024-05-31

## 2024-06-21 ENCOUNTER — APPOINTMENT (OUTPATIENT)
Dept: CT IMAGING | Facility: CLINIC | Age: 49
End: 2024-06-21
Payer: COMMERCIAL

## 2024-06-21 ENCOUNTER — OUTPATIENT (OUTPATIENT)
Dept: OUTPATIENT SERVICES | Facility: HOSPITAL | Age: 49
LOS: 1 days | End: 2024-06-21
Payer: COMMERCIAL

## 2024-06-21 DIAGNOSIS — Z98.891 HISTORY OF UTERINE SCAR FROM PREVIOUS SURGERY: Chronic | ICD-10-CM

## 2024-06-21 DIAGNOSIS — R07.89 OTHER CHEST PAIN: ICD-10-CM

## 2024-06-21 DIAGNOSIS — R06.09 OTHER FORMS OF DYSPNEA: ICD-10-CM

## 2024-06-21 DIAGNOSIS — E11.42 TYPE 2 DIABETES MELLITUS WITH DIABETIC POLYNEUROPATHY: ICD-10-CM

## 2024-06-21 PROCEDURE — 75574 CT ANGIO HRT W/3D IMAGE: CPT | Mod: 26

## 2024-06-21 PROCEDURE — 75574 CT ANGIO HRT W/3D IMAGE: CPT

## 2024-08-23 ENCOUNTER — APPOINTMENT (OUTPATIENT)
Dept: ENDOCRINOLOGY | Facility: CLINIC | Age: 49
End: 2024-08-23

## 2025-01-23 NOTE — ED ADULT NURSE NOTE - DOES PATIENT HAVE ADVANCE DIRECTIVE
Winger ambulatory encounter :    [facility], [address]  [address]   [address]  [place] [address]  [phone]    Assessment:  Diagnoses and associated orders for this visit:  1. Situational anxiety  Assessment & Plan:  She was informed about the potential side effects of alprazolam, including sedation particularly when driving.   She signed Controlled substance agreement.    UDS will will be obtained as per clinic's policy   Her alprazolam dosage will be increased from 0.5 mg to 1 mg to be taken 20 to 30 minutes prior to flying Since lower dose was not working for her.   PDMP reviewed -without aberrancy  Orders:  -     Pain Management Profile  -     ALPRAZolam  2. Rash  Assessment & Plan:  Suspect patient had a flare of pityriasis rosea.  She has been using triamcinolone lotion and rash has significantly improved since December when it flareup.  Rash is not consistent with psoriasis, which patient was concerned about due to family history of it.  Psoriasis signs were discussed with patient and she was encouraged to reach back if she develops these.   Encourage patient to reach back if her exanthem does not resolve in few weeks.  3. Anxiety with flying  Assessment & Plan:  She was informed about the potential side effects of alprazolam, including sedation particularly when driving.   She signed Controlled substance agreement.    UDS will will be obtained as per clinic's policy   Her alprazolam dosage will be increased from 0.5 mg to 1 mg to be taken 20 to 30 minutes prior to flying Since lower dose was not working for her.   PDMP reviewed -without aberrancy  4. Encounter to establish care  Assessment & Plan:  Personal/family/surgical/social history and medications reviewed. Patient was reminded of due immunizations and screenings.   Her last Pap smear was conducted in 2022, indicating that she is due for another this  year. Her laboratory results from 2022 were within normal limits.  Patient was congratulated for following healthy habits such as exercising regularly and eating a well-balanced diet.    Given her age and recent normal lab results, it is deemed appropriate to defer further lab work until next year.        Plan:    Return in about 7 months (around 8/23/2025) for CPE.  Instructions noted per AVS (After Visit Summary)/patient instructions.  The patient indicates understanding of these issues and agrees with the plan.     SUBJECTIVE:  She is  a 26 year old female who presented requesting evaluation for establishing care, chronic conditions.    The patient is a pleasant 26-year-old female who presents today to establish care and discuss several concerns, including a leg rash and anxiety.     She reports the onset of a small, red spot on her chest in early December 2024, which subsequently spread to her back, stomach, and arms within a week. The rash, characterized by itchy red spots, later extended to her legs. A physician initially diagnosed it as pityriasis rosea, but the cause remains undetermined. The rash has since resolved on her chest and stomach but persists on her legs. She recently returned from [place] where exposure to pool nay exacerbated the redness and burning sensation of the rash. The current rash on her legs is flat and non-itchy, a significant improvement from its initial state. She was prescribed triamcinolone, which she believes was beneficial. She also took acyclovir as her doctor thought it was a viral infection. She has not used ketoconazole lotion for this rash. She does not recall any dietary changes or use of new perfumes, lotions, or soaps that could have triggered the rash. She admits to being under significant work-related stress at the time of the rash's onset. She tans only during the summer and does not use tanning beds, although she did so for 1 to 2 years when she was younger.  She has a history of seborrheic dermatitis on her scalp and eyebrows around the age of 14 or 15, but has not experienced any issues since then. She had chickenpox as a child.    She experiences anxiety related to air travel, necessitating medication. She anticipates a potential work-related trip later this year. Her previous physician was reluctant to prescribe medication for her travel anxiety, leading to a delay in refilling her prescription. She has found that a dosage of 0.25 mg of alprazolam was not effective enough, so she was taking 3 tablets which were effective in managing her symptoms. She travels a few times a year, sometimes for work and sometimes for pleasure. She does not need any refills or pills at this time.     She has been on birth control for a long time and her menses are regular. She is sexually active and uses birth control pills. She does not use condoms or diaphragms. She is not concerned about STI screening as she has been in a stable relationship with her boyfriend for 5 years. She maintains a regular exercise routine, engaging in sports 3 to 5 times a week, and incorporates cycling and weightlifting into her regimen. She also prioritizes a diet rich in fiber, vegetables, fruits, and lean proteins. She reports regular bowel movements. Her last Pap smear was conducted in 2022. She is under the care of Dr. Baig for OB/GYN services and birth control management.    Supplemental Information  She has had wisdom tooth extraction and turbinate reduction in 2020. She reports no swelling in her ears but experiences discomfort when opening her jaw. A previous physician identified fluid in her ear. She has a history of frequent sinus infections, which have decreased since her turbinate reduction surgery.    SOCIAL HISTORY  She vapes nicotine socially, approximately 3 times a week. She consumes alcohol on the weekends, typically 2 or 3 days a week, and tries not to have more than 6 drinks at a time.  She does not use marijuana or cocaine. She works as a  at [employer].    FAMILY HISTORY  Her father had psoriasis and  from a massive heart attack at age 61. Her paternal grandfather  of a massive heart attack at age 50. Her maternal grandmother had COPD, hypertension, and thyroid issues. Her maternal grandfather had either bone or brain cancer. There is no family history of diabetes.                    Follow-up  The patient will follow up in 2025 for a physical examination.    PROCEDURE  The patient underwent wisdom tooth extraction and turbinate reduction in .        OBJECTIVE:  Allergies:   ALLERGIES:   Allergen Reactions    Sulfa Antibiotics SHORTNESS OF BREATH       Past Medical Hx:  Past Medical History:   Diagnosis Date    Acne        Medications  Current Outpatient Medications   Medication Sig Dispense Refill    Ascorbic Acid (vitamin C) 1000 MG tablet Take 1,000 mg by mouth as needed.      triamcinolone (KENALOG) 0.5 % ointment Apply 1 each topically daily as needed (rash on legs).      norgestrel-ethinyl estradiol (LO/OVRAL) 0.3-30 MG-MCG per tablet Take 1 tablet by mouth daily. 84 tablet 3    ALPRAZolam (XANAX) 1 MG tablet Take 1 tab 20-30 min prior to flying. May cause drowsiness 5 tablet 0     No current facility-administered medications for this visit.       Surgical Hx:  Past Surgical History:   Procedure Laterality Date    Nose surgery      turbinate reduction    Rhodes tooth extraction         Family hx:  Family History   Problem Relation Age of Onset    Patient is unaware of any medical problems Mother     Myocardial Infarction Father 61         from this    Hypertension Father     Hypertension Maternal Grandmother     Thyroid Maternal Grandmother     COPD Maternal Grandmother     Cancer Maternal Grandfather         brain cancer    Myocardial Infarction Paternal Grandmother 50    Bone cancer Paternal Grandmother     Myocardial Infarction Paternal Grandfather         Social hx:  Social History     Tobacco Use    Smoking status: Never    Smokeless tobacco: Current    Tobacco comments:     Vaping. Socially. 3 x week. Nicotine.    Vaping Use    Vaping status: Some Days    Substances: Nicotine, Flavoring    Devices: Disposable, Pre-filled or refillable cartridge   Substance Use Topics    Alcohol use: Yes     Alcohol/week: 5.0 standard drinks of alcohol     Types: 5 Standard drinks or equivalent per week     Comment: socially/occasionally    Drug use: No     Sexual hx:  no concerns.   Work: [employer].   Exercise: 3-5 times week. Weight   Diet:tries to priotize protein and fiber.    REVIEW OF SYSTEMS:   As per HPI    PHYSICAL EXAM:  Visit Vitals  BP 98/66 (BP Location: LUE - Left upper extremity, Patient Position: Sitting, Cuff Size: Regular)   Pulse 76   Resp 14   Ht 5' 5\" (1.651 m)   Wt 75.2 kg (165 lb 12.6 oz)   LMP 08/13/2024   SpO2 99%   BMI 27.59 kg/m²     Constitutional:  Well developed, well nourished female in no acute distress.  Skin:  Warm, dry, intact without rash or lesion.  Flat, scaly small macules, hyperpigmented were noted to bilateral thighs.  Auspitz sign noted.   HEENT:  Conjunctivae clear.  No icterus.  No cervical lymphadenopathy.  Bilateral tympanic membranes non erythematous, no effusions.  Ear canals normal bilaterally  Lungs:  No labored breathing, no accessory muscle use.  Clear to auscultation bilaterally without wheezes, rhonchi rales.  Cardiovascular:  Regular rate.  Regular rhythm, no murmurs.  Abdomen:  Non-distended.  Soft, nontender, no rebound or guarding.  Neurologic:  Alert and oriented x 3, Symmetric ROM.   Psychiatric:  Speech and behavior appropriate.   MSK: Able to ambulate without difficulty.       LAB RESULTS:  Reviewed     No

## 2025-01-31 ENCOUNTER — APPOINTMENT (OUTPATIENT)
Dept: PULMONOLOGY | Facility: CLINIC | Age: 50
End: 2025-01-31
Payer: COMMERCIAL

## 2025-01-31 ENCOUNTER — APPOINTMENT (OUTPATIENT)
Dept: PULMONOLOGY | Facility: CLINIC | Age: 50
End: 2025-01-31

## 2025-01-31 VITALS
DIASTOLIC BLOOD PRESSURE: 80 MMHG | SYSTOLIC BLOOD PRESSURE: 130 MMHG | WEIGHT: 197.13 LBS | HEIGHT: 63 IN | HEART RATE: 82 BPM | BODY MASS INDEX: 34.93 KG/M2 | OXYGEN SATURATION: 98 % | TEMPERATURE: 97.3 F

## 2025-01-31 DIAGNOSIS — J45.901 UNSPECIFIED ASTHMA WITH (ACUTE) EXACERBATION: ICD-10-CM

## 2025-01-31 PROCEDURE — 94664 DEMO&/EVAL PT USE INHALER: CPT | Mod: NC,59

## 2025-01-31 PROCEDURE — A7004: CPT

## 2025-01-31 PROCEDURE — 94640 AIRWAY INHALATION TREATMENT: CPT

## 2025-01-31 PROCEDURE — 99204 OFFICE O/P NEW MOD 45 MIN: CPT | Mod: 25

## 2025-01-31 RX ORDER — ALBUTEROL SULFATE 2.5 MG/3ML
(2.5 MG/3ML) SOLUTION RESPIRATORY (INHALATION)
Qty: 1 | Refills: 5 | Status: ACTIVE | COMMUNITY
Start: 2025-01-31 | End: 1900-01-01

## 2025-01-31 RX ORDER — FLUTICASONE PROPIONATE AND SALMETEROL 250; 50 UG/1; UG/1
250-50 POWDER RESPIRATORY (INHALATION)
Qty: 3 | Refills: 3 | Status: ACTIVE | COMMUNITY
Start: 2025-01-31 | End: 1900-01-01

## 2025-01-31 RX ORDER — ALBUTEROL SULFATE 1.25 MG/3ML
1.25 SOLUTION RESPIRATORY (INHALATION)
Qty: 0 | Refills: 0 | Status: COMPLETED | OUTPATIENT
Start: 2025-01-31

## 2025-01-31 RX ORDER — METHYLPREDNISOLONE 4 MG/1
4 TABLET ORAL
Qty: 1 | Refills: 0 | Status: ACTIVE | COMMUNITY
Start: 2025-01-31 | End: 1900-01-01

## 2025-01-31 RX ORDER — ALBUTEROL SULFATE 90 UG/1
108 (90 BASE) INHALANT RESPIRATORY (INHALATION)
Qty: 3 | Refills: 2 | Status: ACTIVE | COMMUNITY
Start: 2025-01-31

## 2025-01-31 RX ADMIN — ALBUTEROL SULFATE 0 MG/3ML: 1.25 SOLUTION RESPIRATORY (INHALATION) at 00:00

## 2025-02-13 ENCOUNTER — APPOINTMENT (OUTPATIENT)
Dept: RADIOLOGY | Facility: HOSPITAL | Age: 50
End: 2025-02-13

## 2025-02-13 ENCOUNTER — OUTPATIENT (OUTPATIENT)
Dept: OUTPATIENT SERVICES | Facility: HOSPITAL | Age: 50
LOS: 1 days | End: 2025-02-13
Payer: COMMERCIAL

## 2025-02-13 DIAGNOSIS — Z00.8 ENCOUNTER FOR OTHER GENERAL EXAMINATION: ICD-10-CM

## 2025-02-13 DIAGNOSIS — Z98.891 HISTORY OF UTERINE SCAR FROM PREVIOUS SURGERY: Chronic | ICD-10-CM

## 2025-02-13 PROCEDURE — 71046 X-RAY EXAM CHEST 2 VIEWS: CPT | Mod: 26

## 2025-02-13 PROCEDURE — 71046 X-RAY EXAM CHEST 2 VIEWS: CPT

## 2025-02-14 ENCOUNTER — APPOINTMENT (OUTPATIENT)
Dept: PULMONOLOGY | Facility: CLINIC | Age: 50
End: 2025-02-14
Payer: COMMERCIAL

## 2025-02-14 VITALS
SYSTOLIC BLOOD PRESSURE: 136 MMHG | BODY MASS INDEX: 34.91 KG/M2 | HEART RATE: 81 BPM | DIASTOLIC BLOOD PRESSURE: 82 MMHG | OXYGEN SATURATION: 97 % | HEIGHT: 63 IN | WEIGHT: 197 LBS | RESPIRATION RATE: 18 BRPM | TEMPERATURE: 97.5 F

## 2025-02-14 DIAGNOSIS — R40.0 SOMNOLENCE: ICD-10-CM

## 2025-02-14 DIAGNOSIS — K21.9 GASTRO-ESOPHAGEAL REFLUX DISEASE W/OUT ESOPHAGITIS: ICD-10-CM

## 2025-02-14 DIAGNOSIS — J45.909 UNSPECIFIED ASTHMA, UNCOMPLICATED: ICD-10-CM

## 2025-02-14 DIAGNOSIS — Z23 ENCOUNTER FOR IMMUNIZATION: ICD-10-CM

## 2025-02-14 PROCEDURE — 90677 PCV20 VACCINE IM: CPT

## 2025-02-14 PROCEDURE — 99214 OFFICE O/P EST MOD 30 MIN: CPT | Mod: 25

## 2025-02-14 PROCEDURE — 90471 IMMUNIZATION ADMIN: CPT

## 2025-02-14 PROCEDURE — G2211 COMPLEX E/M VISIT ADD ON: CPT | Mod: NC

## 2025-02-14 RX ORDER — FAMOTIDINE 20 MG/1
20 TABLET, FILM COATED ORAL
Qty: 90 | Refills: 0 | Status: ACTIVE | COMMUNITY
Start: 2025-02-14 | End: 1900-01-01

## 2025-02-21 ENCOUNTER — APPOINTMENT (OUTPATIENT)
Dept: ENDOCRINOLOGY | Facility: CLINIC | Age: 50
End: 2025-02-21
Payer: COMMERCIAL

## 2025-02-21 VITALS
HEART RATE: 76 BPM | RESPIRATION RATE: 17 BRPM | HEIGHT: 63 IN | SYSTOLIC BLOOD PRESSURE: 132 MMHG | WEIGHT: 193 LBS | DIASTOLIC BLOOD PRESSURE: 76 MMHG | OXYGEN SATURATION: 96 % | TEMPERATURE: 97.4 F | BODY MASS INDEX: 34.2 KG/M2

## 2025-02-21 DIAGNOSIS — E11.42 TYPE 2 DIABETES MELLITUS WITH DIABETIC POLYNEUROPATHY: ICD-10-CM

## 2025-02-21 DIAGNOSIS — I10 ESSENTIAL (PRIMARY) HYPERTENSION: ICD-10-CM

## 2025-02-21 DIAGNOSIS — E78.5 HYPERLIPIDEMIA, UNSPECIFIED: ICD-10-CM

## 2025-02-21 DIAGNOSIS — E11.9 TYPE 2 DIABETES MELLITUS W/OUT COMPLICATIONS: ICD-10-CM

## 2025-02-21 DIAGNOSIS — E78.1 PURE HYPERGLYCERIDEMIA: ICD-10-CM

## 2025-02-21 PROCEDURE — 99214 OFFICE O/P EST MOD 30 MIN: CPT

## 2025-02-21 PROCEDURE — G2211 COMPLEX E/M VISIT ADD ON: CPT | Mod: NC

## 2025-02-28 ENCOUNTER — OUTPATIENT (OUTPATIENT)
Dept: OUTPATIENT SERVICES | Facility: HOSPITAL | Age: 50
LOS: 1 days | End: 2025-02-28
Payer: COMMERCIAL

## 2025-02-28 DIAGNOSIS — G47.33 OBSTRUCTIVE SLEEP APNEA (ADULT) (PEDIATRIC): ICD-10-CM

## 2025-02-28 DIAGNOSIS — Z98.891 HISTORY OF UTERINE SCAR FROM PREVIOUS SURGERY: Chronic | ICD-10-CM

## 2025-02-28 PROCEDURE — 95800 SLP STDY UNATTENDED: CPT | Mod: 26

## 2025-02-28 PROCEDURE — 95800 SLP STDY UNATTENDED: CPT

## 2025-05-07 ENCOUNTER — NON-APPOINTMENT (OUTPATIENT)
Age: 50
End: 2025-05-07

## 2025-05-07 ENCOUNTER — APPOINTMENT (OUTPATIENT)
Dept: PULMONOLOGY | Facility: CLINIC | Age: 50
End: 2025-05-07
Payer: COMMERCIAL

## 2025-05-07 VITALS
HEIGHT: 63 IN | DIASTOLIC BLOOD PRESSURE: 68 MMHG | TEMPERATURE: 97.7 F | HEART RATE: 78 BPM | BODY MASS INDEX: 35.44 KG/M2 | WEIGHT: 200 LBS | OXYGEN SATURATION: 97 % | RESPIRATION RATE: 17 BRPM | SYSTOLIC BLOOD PRESSURE: 124 MMHG

## 2025-05-07 DIAGNOSIS — G47.33 OBSTRUCTIVE SLEEP APNEA (ADULT) (PEDIATRIC): ICD-10-CM

## 2025-05-07 DIAGNOSIS — J45.909 UNSPECIFIED ASTHMA, UNCOMPLICATED: ICD-10-CM

## 2025-05-07 PROCEDURE — 99214 OFFICE O/P EST MOD 30 MIN: CPT | Mod: 25

## 2025-05-07 PROCEDURE — 95012 NITRIC OXIDE EXP GAS DETER: CPT

## 2025-05-16 ENCOUNTER — RX RENEWAL (OUTPATIENT)
Age: 50
End: 2025-05-16

## 2025-05-23 ENCOUNTER — APPOINTMENT (OUTPATIENT)
Dept: ENDOCRINOLOGY | Facility: CLINIC | Age: 50
End: 2025-05-23

## 2025-07-11 ENCOUNTER — APPOINTMENT (OUTPATIENT)
Dept: PULMONOLOGY | Facility: CLINIC | Age: 50
End: 2025-07-11
Payer: COMMERCIAL

## 2025-07-11 ENCOUNTER — APPOINTMENT (OUTPATIENT)
Dept: ENDOCRINOLOGY | Facility: CLINIC | Age: 50
End: 2025-07-11
Payer: COMMERCIAL

## 2025-07-11 VITALS
WEIGHT: 200 LBS | SYSTOLIC BLOOD PRESSURE: 122 MMHG | OXYGEN SATURATION: 97 % | DIASTOLIC BLOOD PRESSURE: 74 MMHG | TEMPERATURE: 97.6 F | BODY MASS INDEX: 35.44 KG/M2 | HEIGHT: 63 IN | RESPIRATION RATE: 17 BRPM | HEART RATE: 76 BPM

## 2025-07-11 PROBLEM — Z71.89 CPAP USE COUNSELING: Status: ACTIVE | Noted: 2025-07-11

## 2025-07-11 PROCEDURE — G2211 COMPLEX E/M VISIT ADD ON: CPT | Mod: NC

## 2025-07-11 PROCEDURE — 99214 OFFICE O/P EST MOD 30 MIN: CPT

## 2025-07-11 PROCEDURE — 99215 OFFICE O/P EST HI 40 MIN: CPT

## 2025-07-11 RX ORDER — TIRZEPATIDE 5 MG/.5ML
5 INJECTION, SOLUTION SUBCUTANEOUS
Qty: 2 | Refills: 6 | Status: ACTIVE | COMMUNITY
Start: 2025-07-11 | End: 1900-01-01

## 2025-07-11 RX ORDER — TIRZEPATIDE 2.5 MG/.5ML
2.5 INJECTION, SOLUTION SUBCUTANEOUS
Qty: 4 | Refills: 0 | Status: ACTIVE | COMMUNITY
Start: 2025-07-11 | End: 1900-01-01

## 2025-08-05 ENCOUNTER — APPOINTMENT (OUTPATIENT)
Dept: INTERNAL MEDICINE | Facility: CLINIC | Age: 50
End: 2025-08-05
Payer: COMMERCIAL

## 2025-08-05 VITALS
DIASTOLIC BLOOD PRESSURE: 70 MMHG | WEIGHT: 200 LBS | TEMPERATURE: 97.1 F | SYSTOLIC BLOOD PRESSURE: 120 MMHG | HEIGHT: 63 IN | HEART RATE: 72 BPM | OXYGEN SATURATION: 97 % | BODY MASS INDEX: 35.44 KG/M2

## 2025-08-05 DIAGNOSIS — G47.00 INSOMNIA, UNSPECIFIED: ICD-10-CM

## 2025-08-05 DIAGNOSIS — G47.33 OBSTRUCTIVE SLEEP APNEA (ADULT) (PEDIATRIC): ICD-10-CM

## 2025-08-05 PROCEDURE — 99213 OFFICE O/P EST LOW 20 MIN: CPT

## 2025-09-11 ENCOUNTER — NON-APPOINTMENT (OUTPATIENT)
Age: 50
End: 2025-09-11

## 2025-09-11 ENCOUNTER — APPOINTMENT (OUTPATIENT)
Dept: ENDOCRINOLOGY | Facility: CLINIC | Age: 50
End: 2025-09-11
Payer: COMMERCIAL

## 2025-09-11 VITALS
HEART RATE: 75 BPM | SYSTOLIC BLOOD PRESSURE: 142 MMHG | RESPIRATION RATE: 17 BRPM | WEIGHT: 205 LBS | BODY MASS INDEX: 36.32 KG/M2 | OXYGEN SATURATION: 95 % | TEMPERATURE: 97.5 F | DIASTOLIC BLOOD PRESSURE: 76 MMHG | HEIGHT: 63 IN

## 2025-09-11 DIAGNOSIS — E78.1 PURE HYPERGLYCERIDEMIA: ICD-10-CM

## 2025-09-11 DIAGNOSIS — E66.9 OBESITY, UNSPECIFIED: ICD-10-CM

## 2025-09-11 DIAGNOSIS — I10 ESSENTIAL (PRIMARY) HYPERTENSION: ICD-10-CM

## 2025-09-11 DIAGNOSIS — G47.33 OBSTRUCTIVE SLEEP APNEA (ADULT) (PEDIATRIC): ICD-10-CM

## 2025-09-11 DIAGNOSIS — E11.42 TYPE 2 DIABETES MELLITUS WITH DIABETIC POLYNEUROPATHY: ICD-10-CM

## 2025-09-11 DIAGNOSIS — E11.9 TYPE 2 DIABETES MELLITUS W/OUT COMPLICATIONS: ICD-10-CM

## 2025-09-11 DIAGNOSIS — E78.5 HYPERLIPIDEMIA, UNSPECIFIED: ICD-10-CM

## 2025-09-11 LAB — HBA1C MFR BLD HPLC: 7.1

## 2025-09-11 PROCEDURE — G2211 COMPLEX E/M VISIT ADD ON: CPT | Mod: NC

## 2025-09-11 PROCEDURE — 99214 OFFICE O/P EST MOD 30 MIN: CPT
